# Patient Record
Sex: FEMALE | Race: WHITE | Employment: OTHER | URBAN - METROPOLITAN AREA
[De-identification: names, ages, dates, MRNs, and addresses within clinical notes are randomized per-mention and may not be internally consistent; named-entity substitution may affect disease eponyms.]

---

## 2018-03-14 ENCOUNTER — OFFICE VISIT (OUTPATIENT)
Dept: FAMILY MEDICINE CLINIC | Age: 53
End: 2018-03-14
Payer: COMMERCIAL

## 2018-03-14 VITALS
DIASTOLIC BLOOD PRESSURE: 70 MMHG | RESPIRATION RATE: 16 BRPM | HEIGHT: 67 IN | SYSTOLIC BLOOD PRESSURE: 118 MMHG | OXYGEN SATURATION: 99 % | WEIGHT: 148 LBS | HEART RATE: 88 BPM | BODY MASS INDEX: 23.23 KG/M2 | TEMPERATURE: 97.8 F

## 2018-03-14 DIAGNOSIS — Z00.00 WELLNESS EXAMINATION: Primary | ICD-10-CM

## 2018-03-14 PROCEDURE — 99396 PREV VISIT EST AGE 40-64: CPT | Performed by: FAMILY MEDICINE

## 2018-03-14 RX ORDER — ACETAMINOPHEN 160 MG
1 TABLET,DISINTEGRATING ORAL DAILY
COMMUNITY

## 2018-03-14 RX ORDER — MULTIVITAMIN WITH IRON
100 TABLET ORAL DAILY
COMMUNITY
End: 2020-04-22

## 2018-03-14 RX ORDER — INFLUENZA A VIRUS A/MICHIGAN/45/2015 X-275 (H1N1) ANTIGEN (FORMALDEHYDE INACTIVATED), INFLUENZA A VIRUS A/SINGAPORE/INFIMH-16-0019/2016 IVR-186 (H3N2) ANTIGEN (FORMALDEHYDE INACTIVATED), INFLUENZA B VIRUS B/PHUKET/3073/2013 ANTIGEN (FORMALDEHYDE INACTIVATED), AND INFLUENZA B VIRUS B/MARYLAND/15/2016 BX-69A ANTIGEN (FORMALDEHYDE INACTIVATED) 15; 15; 15; 15 UG/.5ML; UG/.5ML; UG/.5ML; UG/.5ML
INJECTION, SUSPENSION INTRAMUSCULAR
Refills: 0 | COMMUNITY
Start: 2018-01-19 | End: 2018-03-14 | Stop reason: CLARIF

## 2018-03-14 ASSESSMENT — PATIENT HEALTH QUESTIONNAIRE - PHQ9
SUM OF ALL RESPONSES TO PHQ QUESTIONS 1-9: 0
1. LITTLE INTEREST OR PLEASURE IN DOING THINGS: 0
2. FEELING DOWN, DEPRESSED OR HOPELESS: 0
SUM OF ALL RESPONSES TO PHQ9 QUESTIONS 1 & 2: 0

## 2018-03-14 NOTE — PROGRESS NOTES
3/14/2018    Chief Complaint   Patient presents with    Annual Exam          Subjective   patient is here for a yearly physical.  No complaints           1. Wellness examination    - Lipid Panel; Future  - Comprehensive Metabolic Panel; Future  - CBC Auto Differential; Future  - TSH without Reflex;  Future  - Vitamin D 25 Hydrox, D2 & D3; Future        Goals      Blood Pressure < 140/90      Exercise 3x per week (30 min per time)         Goals   Review Of Systems    General:  No weight change no malaise no fatigue no change in appetite no sleep disturbance no fever/chills no night sweats  Skin:                 no abnormal pigmentation, no rash, no scaling,no itching,no Masses,no  hair or nail changes  Eyes:               no blurring, no diplopia, no  eye pain no glaucoma no cataracts + glasses   ENT:                 no hearing loss,no  Tinnitus,no  Vertigo,no osebleed, no nasal congestion, no rhinorrhea,  no sore throat no jaw pain no hoarseness no bleeding  Gums   ___ dentures  Neck:                no node tenderness , not rigid, no masses   Respiratory:            no cough, no sputum,  No coughing blood, no pleuritic , no chest pain, no dyspnea,  no wheezing  Cardiovascular:         no angina,  No chest pain  No syncope, no pedal edema , no orthopnea, no PND, no palpitations, no claudication  Gastrointestinal  no nausea, no vomiting, no heartburn, no diarrhea, no constipation, no bloating,  no abdominal pain, no rectal pain, no bleeding no hemorrhoids, no hernia  Genitourinary:           no urinary urgency, no frequency, no dysuria, no nocturia, hesitancy, no  Incontinence, no bleeding, no stones  Musculoskeletal:         no arthritis, no  arthralgia, no myalgia,no  weakness,  no morning stiffness, no joint swelling  Neurologic:                no paralysis, no resis,no  paresthesia, no seizures,no  tremors,no headaches, no tumors , no stroke, no speech issues,  No incoordination, no head trauma, no memory dry. Not pale, not flushed , no lesions, No rash , no bruises  HEENT:   PERRLA, EOMI. Conjunctiva clear, no  Drainage, no jaundice, ext canals normal ,no cerumen impaction,TM's normal ,not injected ,no fluid . Normal nasal mucosa not boggy , not inflamed . Septum normal, turbinates not swollen, oral mucosa moist , lips normal, teeth normal, tongue normal, salivary glands normal, no sinus tenderness, tmj normal, throat no injected, no cobblestoning, no drainage , tonsils normal no exudates  Neck:    Supple,No JVD, No thyromegaly, No carotid bruit, no adenopathy  Cardiac:   PMI Normal,  RRR, No gallop , no  murmur. No S3, no S4, no abd aortic bruit, normal pulses, , no pedal edema  Lungs:   CTA, symmetrical,  No wheezes no rales, Normal percussion, no use of accessory muscles,   Abdomen:  Normal bowel sounds, abd soft, non-tender, no rebound no guarding,no hepatomegaly, no splenomegaly, no hernia, no ascites, normal palpation  Extremities:   No clubbing, no edema no cyanosis. Pedal pulses ok  M/S:  Back normal,no kyphosis, no cva tenderness,  Head and neck normal rom, shoulders normal strength , , hips normal , gait normal , no cane or walker , no motor deficits, no clubbing, normal nails  Neurological:   A & O x 3, Moves all extremities,  No gross neuro deficits ,normal DTR, normal memory, normal judgement/insight, normal affect    Physical exa       Assessment:    1. Wellness examination    - Lipid Panel; Future  - Comprehensive Metabolic Panel; Future  - CBC Auto Differential; Future  - TSH without Reflex; Future  - Vitamin D 25 Hydrox, D2 & D3; Future                      4. Reviewed labs    5. No Follow-up on file.            Angela Hayward M.D.    3/14/2018

## 2018-04-02 LAB
ALBUMIN SERPL-MCNC: NORMAL G/DL
ALP BLD-CCNC: NORMAL U/L
ALT SERPL-CCNC: NORMAL U/L
ANION GAP SERPL CALCULATED.3IONS-SCNC: NORMAL MMOL/L
AST SERPL-CCNC: NORMAL U/L
BILIRUB SERPL-MCNC: NORMAL MG/DL (ref 0.1–1.4)
BUN BLDV-MCNC: NORMAL MG/DL
CALCIUM SERPL-MCNC: NORMAL MG/DL
CHLORIDE BLD-SCNC: NORMAL MMOL/L
CHOLESTEROL, TOTAL: 214 MG/DL
CHOLESTEROL/HDL RATIO: 3.8
CO2: NORMAL MMOL/L
CREAT SERPL-MCNC: NORMAL MG/DL
GFR CALCULATED: NORMAL
GLUCOSE BLD-MCNC: 90 MG/DL
HDLC SERPL-MCNC: 57 MG/DL (ref 35–70)
LDL CHOLESTEROL CALCULATED: 133 MG/DL (ref 0–160)
POTASSIUM SERPL-SCNC: NORMAL MMOL/L
SODIUM BLD-SCNC: NORMAL MMOL/L
TOTAL PROTEIN: NORMAL
TRIGL SERPL-MCNC: 129 MG/DL
VLDLC SERPL CALC-MCNC: NORMAL MG/DL

## 2018-04-10 DIAGNOSIS — Z00.00 WELLNESS EXAMINATION: ICD-10-CM

## 2018-04-13 PROBLEM — Z00.00 WELLNESS EXAMINATION: Status: RESOLVED | Noted: 2018-03-14 | Resolved: 2018-04-13

## 2019-10-29 ENCOUNTER — OFFICE VISIT (OUTPATIENT)
Dept: FAMILY MEDICINE CLINIC | Age: 54
End: 2019-10-29
Payer: COMMERCIAL

## 2019-10-29 VITALS
BODY MASS INDEX: 23.86 KG/M2 | RESPIRATION RATE: 16 BRPM | WEIGHT: 152 LBS | OXYGEN SATURATION: 98 % | DIASTOLIC BLOOD PRESSURE: 62 MMHG | HEART RATE: 74 BPM | TEMPERATURE: 97.4 F | SYSTOLIC BLOOD PRESSURE: 102 MMHG | HEIGHT: 67 IN

## 2019-10-29 DIAGNOSIS — R42 DIZZINESS: Primary | ICD-10-CM

## 2019-10-29 DIAGNOSIS — E55.9 VITAMIN D DEFICIENCY: ICD-10-CM

## 2019-10-29 DIAGNOSIS — Z00.00 WELLNESS EXAMINATION: ICD-10-CM

## 2019-10-29 DIAGNOSIS — C44.320 SQUAMOUS CELL CARCINOMA OF FACE: ICD-10-CM

## 2019-10-29 DIAGNOSIS — Z53.1 REFUSAL OF BLOOD TRANSFUSIONS AS PATIENT IS JEHOVAH'S WITNESS: ICD-10-CM

## 2019-10-29 PROCEDURE — 99396 PREV VISIT EST AGE 40-64: CPT | Performed by: FAMILY MEDICINE

## 2019-10-29 PROCEDURE — 93000 ELECTROCARDIOGRAM COMPLETE: CPT | Performed by: FAMILY MEDICINE

## 2019-10-29 ASSESSMENT — ENCOUNTER SYMPTOMS
STRIDOR: 0
SHORTNESS OF BREATH: 0
GASTROINTESTINAL NEGATIVE: 1
COUGH: 0
CHEST TIGHTNESS: 0

## 2019-10-29 ASSESSMENT — PATIENT HEALTH QUESTIONNAIRE - PHQ9
2. FEELING DOWN, DEPRESSED OR HOPELESS: 0
SUM OF ALL RESPONSES TO PHQ9 QUESTIONS 1 & 2: 0
SUM OF ALL RESPONSES TO PHQ QUESTIONS 1-9: 0
SUM OF ALL RESPONSES TO PHQ QUESTIONS 1-9: 0
1. LITTLE INTEREST OR PLEASURE IN DOING THINGS: 0

## 2019-11-25 ENCOUNTER — TELEPHONE (OUTPATIENT)
Dept: FAMILY MEDICINE CLINIC | Age: 54
End: 2019-11-25

## 2019-11-25 LAB
ALBUMIN SERPL-MCNC: NORMAL G/DL
ALP BLD-CCNC: NORMAL U/L
ALT SERPL-CCNC: NORMAL U/L
ANION GAP SERPL CALCULATED.3IONS-SCNC: NORMAL MMOL/L
AST SERPL-CCNC: NORMAL U/L
BASOPHILS ABSOLUTE: NORMAL
BASOPHILS RELATIVE PERCENT: NORMAL
BILIRUB SERPL-MCNC: NORMAL MG/DL
BUN BLDV-MCNC: NORMAL MG/DL
CALCIUM SERPL-MCNC: NORMAL MG/DL
CHLORIDE BLD-SCNC: NORMAL MMOL/L
CO2: NORMAL
CREAT SERPL-MCNC: NORMAL MG/DL
EOSINOPHILS ABSOLUTE: NORMAL
EOSINOPHILS RELATIVE PERCENT: NORMAL
GFR CALCULATED: NORMAL
GLUCOSE BLD-MCNC: 72 MG/DL
HCT VFR BLD CALC: 42.5 % (ref 36–46)
HEMOGLOBIN: 14.1 G/DL (ref 12–16)
LYMPHOCYTES ABSOLUTE: NORMAL
LYMPHOCYTES RELATIVE PERCENT: NORMAL
MCH RBC QN AUTO: NORMAL PG
MCHC RBC AUTO-ENTMCNC: NORMAL G/DL
MCV RBC AUTO: NORMAL FL
MONOCYTES ABSOLUTE: NORMAL
MONOCYTES RELATIVE PERCENT: NORMAL
NEUTROPHILS ABSOLUTE: NORMAL
NEUTROPHILS RELATIVE PERCENT: NORMAL
PDW BLD-RTO: NORMAL %
PLATELET # BLD: NORMAL 10*3/UL
PMV BLD AUTO: NORMAL FL
POTASSIUM SERPL-SCNC: NORMAL MMOL/L
RBC # BLD: NORMAL 10*6/UL
SODIUM BLD-SCNC: NORMAL MMOL/L
TOTAL PROTEIN: NORMAL
TSH SERPL DL<=0.05 MIU/L-ACNC: 1.59 UIU/ML
VITAMIN D 25-HYDROXY: NORMAL
VITAMIN D2, 25 HYDROXY: NORMAL
VITAMIN D3,25 HYDROXY: NORMAL
WBC # BLD: NORMAL 10*3/UL

## 2019-12-02 LAB — MAMMOGRAPHY, EXTERNAL: NORMAL

## 2019-12-04 ENCOUNTER — TELEPHONE (OUTPATIENT)
Dept: FAMILY MEDICINE CLINIC | Age: 54
End: 2019-12-04

## 2019-12-13 DIAGNOSIS — R42 DIZZINESS: ICD-10-CM

## 2019-12-13 DIAGNOSIS — Z00.00 WELLNESS EXAMINATION: ICD-10-CM

## 2019-12-13 DIAGNOSIS — E55.9 VITAMIN D DEFICIENCY: ICD-10-CM

## 2019-12-13 LAB
CHOLESTEROL, TOTAL: 213 MG/DL
CHOLESTEROL/HDL RATIO: 3.3
HDLC SERPL-MCNC: 64 MG/DL (ref 35–70)
LDL CHOLESTEROL CALCULATED: 137 MG/DL (ref 0–160)
TRIGL SERPL-MCNC: 58 MG/DL
VLDLC SERPL CALC-MCNC: NORMAL MG/DL

## 2020-04-22 ENCOUNTER — HOSPITAL ENCOUNTER (OUTPATIENT)
Age: 55
Discharge: HOME | End: 2020-04-22
Payer: COMMERCIAL

## 2020-04-22 ENCOUNTER — OFFICE VISIT (OUTPATIENT)
Dept: PRIMARY CARE CLINIC | Age: 55
End: 2020-04-22
Payer: COMMERCIAL

## 2020-04-22 ENCOUNTER — TELEPHONE (OUTPATIENT)
Dept: FAMILY MEDICINE CLINIC | Age: 55
End: 2020-04-22

## 2020-04-22 VITALS
TEMPERATURE: 98.5 F | WEIGHT: 150 LBS | HEART RATE: 80 BPM | HEIGHT: 67 IN | OXYGEN SATURATION: 98 % | BODY MASS INDEX: 23.54 KG/M2 | SYSTOLIC BLOOD PRESSURE: 110 MMHG | DIASTOLIC BLOOD PRESSURE: 70 MMHG

## 2020-04-22 LAB
ALBUMIN SERPL-MCNC: 4.4 G/DL (ref 3.5–5.2)
ALP BLD-CCNC: 58 U/L (ref 35–104)
ALT SERPL-CCNC: 18 U/L (ref 0–32)
ANION GAP SERPL CALCULATED.3IONS-SCNC: 12 MMOL/L (ref 7–16)
AST SERPL-CCNC: 15 U/L (ref 0–31)
BASOPHILS ABSOLUTE: 0.04 E9/L (ref 0–0.2)
BASOPHILS RELATIVE PERCENT: 0.5 % (ref 0–2)
BILIRUB SERPL-MCNC: 0.3 MG/DL (ref 0–1.2)
BUN BLDV-MCNC: 17 MG/DL (ref 6–20)
CALCIUM SERPL-MCNC: 9.4 MG/DL (ref 8.6–10.2)
CHLORIDE BLD-SCNC: 107 MMOL/L (ref 98–107)
CO2: 25 MMOL/L (ref 22–29)
CREAT SERPL-MCNC: 0.7 MG/DL (ref 0.5–1)
EOSINOPHILS ABSOLUTE: 0.12 E9/L (ref 0.05–0.5)
EOSINOPHILS RELATIVE PERCENT: 1.6 % (ref 0–6)
GFR AFRICAN AMERICAN: >60
GFR NON-AFRICAN AMERICAN: >60 ML/MIN/1.73
GLUCOSE BLD-MCNC: 92 MG/DL (ref 74–99)
HCT VFR BLD CALC: 39.8 % (ref 34–48)
HEMOGLOBIN: 13.2 G/DL (ref 11.5–15.5)
IMMATURE GRANULOCYTES #: 0.01 E9/L
IMMATURE GRANULOCYTES %: 0.1 % (ref 0–5)
LYMPHOCYTES ABSOLUTE: 2.74 E9/L (ref 1.5–4)
LYMPHOCYTES RELATIVE PERCENT: 37.4 % (ref 20–42)
MCH RBC QN AUTO: 31.2 PG (ref 26–35)
MCHC RBC AUTO-ENTMCNC: 33.2 % (ref 32–34.5)
MCV RBC AUTO: 94.1 FL (ref 80–99.9)
MONOCYTES ABSOLUTE: 0.42 E9/L (ref 0.1–0.95)
MONOCYTES RELATIVE PERCENT: 5.7 % (ref 2–12)
NEUTROPHILS ABSOLUTE: 4 E9/L (ref 1.8–7.3)
NEUTROPHILS RELATIVE PERCENT: 54.7 % (ref 43–80)
PDW BLD-RTO: 12.7 FL (ref 11.5–15)
PLATELET # BLD: 221 E9/L (ref 130–450)
PMV BLD AUTO: 10.9 FL (ref 7–12)
POTASSIUM SERPL-SCNC: 3.4 MMOL/L (ref 3.5–5)
RBC # BLD: 4.23 E12/L (ref 3.5–5.5)
SODIUM BLD-SCNC: 144 MMOL/L (ref 132–146)
TOTAL PROTEIN: 7 G/DL (ref 6.4–8.3)
WBC # BLD: 7.3 E9/L (ref 4.5–11.5)

## 2020-04-22 PROCEDURE — 85025 COMPLETE CBC W/AUTO DIFF WBC: CPT

## 2020-04-22 PROCEDURE — 87329 GIARDIA AG IA: CPT

## 2020-04-22 PROCEDURE — 80053 COMPREHEN METABOLIC PANEL: CPT

## 2020-04-22 PROCEDURE — 87328 CRYPTOSPORIDIUM AG IA: CPT

## 2020-04-22 PROCEDURE — 87045 FECES CULTURE AEROBIC BACT: CPT

## 2020-04-22 PROCEDURE — 36415 COLL VENOUS BLD VENIPUNCTURE: CPT

## 2020-04-22 PROCEDURE — 99213 OFFICE O/P EST LOW 20 MIN: CPT | Performed by: FAMILY MEDICINE

## 2020-04-22 RX ORDER — MELATONIN 5 MG
1 TABLET,CHEWABLE ORAL NIGHTLY PRN
COMMUNITY
End: 2021-05-26

## 2020-04-22 NOTE — TELEPHONE ENCOUNTER
Cosme Hernández says she has had diarrhea for about 1.5 weeks. Some nausea, low fever, 99.3. Never has bowel problems. Suggested she go to Flu clinic because some of symptoms of covid.

## 2020-04-22 NOTE — PATIENT INSTRUCTIONS
Patient Education        Diarrhea: Care Instructions  Your Care Instructions    Diarrhea is loose, watery stools (bowel movements). The exact cause is often hard to find. Sometimes diarrhea is your body's way of getting rid of what caused an upset stomach. Viruses, food poisoning, and many medicines can cause diarrhea. Some people get diarrhea in response to emotional stress, anxiety, or certain foods. Almost everyone has diarrhea now and then. It usually isn't serious, and your stools will return to normal soon. The important thing to do is replace the fluids you have lost, so you can prevent dehydration. The doctor has checked you carefully, but problems can develop later. If you notice any problems or new symptoms, get medical treatment right away. Follow-up care is a key part of your treatment and safety. Be sure to make and go to all appointments, and call your doctor if you are having problems. It's also a good idea to know your test results and keep a list of the medicines you take. How can you care for yourself at home? · Watch for signs of dehydration, which means your body has lost too much water. Dehydration is a serious condition and should be treated right away. Signs of dehydration are:  ? Increasing thirst and dry eyes and mouth. ? Feeling faint or lightheaded. ? A smaller amount of urine than normal.  · To prevent dehydration, drink plenty of fluids. Choose water and other caffeine-free clear liquids until you feel better. If you have kidney, heart, or liver disease and have to limit fluids, talk with your doctor before you increase the amount of fluids you drink. · Begin eating small amounts of mild foods the next day, if you feel like it. ? Try yogurt that has live cultures of Lactobacillus. (Check the label.)  ? Avoid spicy foods, fruits, alcohol, and caffeine until 48 hours after all symptoms are gone. ? Avoid chewing gum that contains sorbitol. ?  Avoid dairy products (except for

## 2020-04-22 NOTE — PROGRESS NOTES
[facility]  1965    Chief Complaint   Patient presents with    Diarrhea    Fatigue       Respiratory Symptoms:  Patient complains of Diarrhea  And Fatigue  1 week(s) history of occational headachec and nausea without vomiting. Symptoms have been worsening with time. She denies any other symptoms . She states that she has had diarrhea for about 9 days. She states her temp has been around 99.3 F yesterday. She has had nausea. She states that diarrhea is worse in the morning and the evening about 9 times a day. She states that she has had worse looking diarrhea. Diarrhea is watery in nature. She states that there were a few times that the stool improved to normal and then the diarrhea started again. She denies any recent antibiotic usage. She denies any vomiting. She denies blood in her stool or dark and tarry stools. She states that she feels bloated. Denies abdominal pain, cough, shortness of breath, nasal congestion, runny nose, sore throat. [place]. She has started to use the well water in her coffee and tea which is not something she usually does. Relevant PMH: No pertinent PMH. Smoking history:  She  reports that she has never smoked. She has never used smokeless tobacco.     She has had no known ill contacts. Treatment to date: NSAID. Travel screen completed:  Yes    BPA for COVID triggered: No      Vitals:    04/22/20 1228   BP: 110/70   Pulse: 80   Temp: 98.5 °F (36.9 °C)   TempSrc: Oral   SpO2: 98%   Weight: 150 lb (68 kg)   Height: 5' 7\" (1.702 m)      Physical Exam  Vitals signs and nursing note reviewed. Constitutional:       Appearance: She is well-developed. HENT:      Head: Normocephalic and atraumatic.       Right Ear: External ear normal.      Left Ear: External ear normal.      Nose: Nose normal.   Eyes:      Conjunctiva/sclera: Conjunctivae normal.      Pupils: Pupils are equal, round, and reactive to

## 2020-04-23 LAB
CRYPTOSPORIDIUM ANTIGEN STOOL: NORMAL
GIARDIA ANTIGEN STOOL: NORMAL

## 2020-04-24 LAB — CULTURE, STOOL: NORMAL

## 2020-04-29 ENCOUNTER — TELEPHONE (OUTPATIENT)
Dept: PRIMARY CARE CLINIC | Age: 55
End: 2020-04-29

## 2020-10-30 ENCOUNTER — TELEPHONE (OUTPATIENT)
Dept: ADMINISTRATIVE | Age: 55
End: 2020-10-30

## 2020-11-04 ENCOUNTER — NURSE ONLY (OUTPATIENT)
Dept: FAMILY MEDICINE CLINIC | Age: 55
End: 2020-11-04
Payer: COMMERCIAL

## 2020-11-04 PROCEDURE — 90471 IMMUNIZATION ADMIN: CPT | Performed by: FAMILY MEDICINE

## 2020-11-04 PROCEDURE — 90686 IIV4 VACC NO PRSV 0.5 ML IM: CPT | Performed by: FAMILY MEDICINE

## 2021-05-26 ENCOUNTER — OFFICE VISIT (OUTPATIENT)
Dept: FAMILY MEDICINE CLINIC | Age: 56
End: 2021-05-26
Payer: COMMERCIAL

## 2021-05-26 VITALS
DIASTOLIC BLOOD PRESSURE: 70 MMHG | BODY MASS INDEX: 24.58 KG/M2 | WEIGHT: 156.6 LBS | HEIGHT: 67 IN | SYSTOLIC BLOOD PRESSURE: 110 MMHG | OXYGEN SATURATION: 98 % | TEMPERATURE: 97.2 F | HEART RATE: 69 BPM | RESPIRATION RATE: 16 BRPM

## 2021-05-26 DIAGNOSIS — K40.20 NON-RECURRENT BILATERAL INGUINAL HERNIA WITHOUT OBSTRUCTION OR GANGRENE: ICD-10-CM

## 2021-05-26 DIAGNOSIS — Z00.00 WELLNESS EXAMINATION: ICD-10-CM

## 2021-05-26 DIAGNOSIS — E55.9 VITAMIN D DEFICIENCY: Primary | ICD-10-CM

## 2021-05-26 PROCEDURE — 99396 PREV VISIT EST AGE 40-64: CPT | Performed by: FAMILY MEDICINE

## 2021-05-26 SDOH — ECONOMIC STABILITY: FOOD INSECURITY: WITHIN THE PAST 12 MONTHS, YOU WORRIED THAT YOUR FOOD WOULD RUN OUT BEFORE YOU GOT MONEY TO BUY MORE.: NEVER TRUE

## 2021-05-26 SDOH — ECONOMIC STABILITY: FOOD INSECURITY: WITHIN THE PAST 12 MONTHS, THE FOOD YOU BOUGHT JUST DIDN'T LAST AND YOU DIDN'T HAVE MONEY TO GET MORE.: NEVER TRUE

## 2021-05-26 ASSESSMENT — PATIENT HEALTH QUESTIONNAIRE - PHQ9
SUM OF ALL RESPONSES TO PHQ QUESTIONS 1-9: 0
2. FEELING DOWN, DEPRESSED OR HOPELESS: 0
1. LITTLE INTEREST OR PLEASURE IN DOING THINGS: 0
SUM OF ALL RESPONSES TO PHQ QUESTIONS 1-9: 0
SUM OF ALL RESPONSES TO PHQ QUESTIONS 1-9: 0
SUM OF ALL RESPONSES TO PHQ9 QUESTIONS 1 & 2: 0

## 2021-05-26 ASSESSMENT — SOCIAL DETERMINANTS OF HEALTH (SDOH): HOW HARD IS IT FOR YOU TO PAY FOR THE VERY BASICS LIKE FOOD, HOUSING, MEDICAL CARE, AND HEATING?: NOT HARD AT ALL

## 2021-05-26 NOTE — PROGRESS NOTES
2021        Cassandra Michaud (: 1965 IS A 60 y.o. female ,Established patient, here for eval of Annual Exam          Current Outpatient Medications   Medication Sig Dispense Refill    Cholecalciferol (VITAMIN D3) 2000 units CAPS Take 1 capsule by mouth daily  (Patient not taking: Reported on 2021)       No current facility-administered medications for this visit.       mediterrean diet  Exercise wise house cleaing  Garden and hoarses    No covid  Vaccine     Sleep issue is bad   Melatonin is working but gives diarrhea      abd pain in the past around inguinal area. Knows she has to do something with this.  [facility] elect[facility] to see Dr Diego Valentino at [facility] and referral will be made when confirms with a phone call back ot us    ASSESSMENT / PLAN      1. Non-recurrent bilateral inguinal hernia without obstruction or gangrene  Surgical referral    2. Vitamin D deficiency  Last vit d level 25  Taking 1000 iu daily  recheck  - Vitamin D 25 Hydroxy; Future    3. Wellness examination    - CBC Auto Differential; Future  - Comprehensive Metabolic Panel; Future  - Lipid Panel; Future  - Vitamin D 25 Hydroxy; Future              SUBJECTIVE    Review of Systems   Constitutional: Negative for activity change, appetite change and fever. Respiratory: Negative. Cardiovascular: Negative. Gastrointestinal: Positive for abdominal pain. Hx hernia bilat     Genitourinary: Negative. Neurological: Negative. OBJECTIVE    Wt Readings from Last 3 Encounters:   21 156 lb 9.6 oz (71 kg)   20 150 lb (68 kg)   10/29/19 152 lb (68.9 kg)       Vitals:    21 0938   BP: 110/70   Pulse: 69   Resp: 16   Temp: 97.2 °F (36.2 °C)   SpO2: 98%       Physical Exam  Constitutional:       Appearance: Normal appearance. Cardiovascular:      Rate and Rhythm: Normal rate and regular rhythm. Heart sounds: Normal heart sounds. Pulmonary:      Breath sounds: Normal breath sounds.    Abdominal:

## 2021-07-28 LAB — MAMMOGRAPHY, EXTERNAL: NORMAL

## 2021-07-29 DIAGNOSIS — E55.9 VITAMIN D DEFICIENCY: ICD-10-CM

## 2021-07-29 DIAGNOSIS — Z00.00 WELLNESS EXAMINATION: ICD-10-CM

## 2021-07-29 LAB
ALBUMIN SERPL-MCNC: NORMAL G/DL
ALP BLD-CCNC: NORMAL U/L
ALT SERPL-CCNC: 19 U/L
ANION GAP SERPL CALCULATED.3IONS-SCNC: NORMAL MMOL/L
AST SERPL-CCNC: 19 U/L
BASOPHILS ABSOLUTE: NORMAL
BASOPHILS RELATIVE PERCENT: NORMAL
BILIRUB SERPL-MCNC: NORMAL MG/DL
BUN BLDV-MCNC: 15 MG/DL
CALCIUM SERPL-MCNC: NORMAL MG/DL
CHLORIDE BLD-SCNC: NORMAL MMOL/L
CHOLESTEROL, TOTAL: 213 MG/DL
CHOLESTEROL/HDL RATIO: NORMAL
CO2: NORMAL
CREAT SERPL-MCNC: 0.66 MG/DL
EOSINOPHILS ABSOLUTE: NORMAL
EOSINOPHILS RELATIVE PERCENT: NORMAL
GFR CALCULATED: NORMAL
GLUCOSE BLD-MCNC: 70 MG/DL
HCT VFR BLD CALC: 41.1 % (ref 36–46)
HDLC SERPL-MCNC: 60 MG/DL (ref 35–70)
HEMOGLOBIN: 13.9 G/DL (ref 12–16)
LDL CHOLESTEROL CALCULATED: 127 MG/DL (ref 0–160)
LYMPHOCYTES ABSOLUTE: NORMAL
LYMPHOCYTES RELATIVE PERCENT: NORMAL
MCH RBC QN AUTO: NORMAL PG
MCHC RBC AUTO-ENTMCNC: NORMAL G/DL
MCV RBC AUTO: NORMAL FL
MONOCYTES ABSOLUTE: NORMAL
MONOCYTES RELATIVE PERCENT: NORMAL
NEUTROPHILS ABSOLUTE: NORMAL
NEUTROPHILS RELATIVE PERCENT: NORMAL
NONHDLC SERPL-MCNC: NORMAL MG/DL
PLATELET # BLD: NORMAL 10*3/UL
PMV BLD AUTO: NORMAL FL
POTASSIUM SERPL-SCNC: NORMAL MMOL/L
RBC # BLD: NORMAL 10*6/UL
SODIUM BLD-SCNC: NORMAL MMOL/L
TOTAL PROTEIN: NORMAL
TRIGL SERPL-MCNC: 129 MG/DL
VITAMIN D 25-HYDROXY: 38.8
VITAMIN D2, 25 HYDROXY: NORMAL
VITAMIN D3,25 HYDROXY: NORMAL
VLDLC SERPL CALC-MCNC: NORMAL MG/DL
WBC # BLD: NORMAL 10*3/UL

## 2021-08-11 ENCOUNTER — TELEPHONE (OUTPATIENT)
Dept: FAMILY MEDICINE CLINIC | Age: 56
End: 2021-08-11

## 2021-08-11 DIAGNOSIS — G47.00 INSOMNIA, UNSPECIFIED TYPE: Primary | ICD-10-CM

## 2021-08-11 NOTE — TELEPHONE ENCOUNTER
----- Message from Juani Soto MA sent at 8/11/2021  1:27 PM EDT -----  Subject: Referral Request    QUESTIONS   Reason for referral request? Referral to [address] [place], [place] [address]mar [facility] Sleep lab. saw   Dr Margarita Fletcher and he recommended sleep study. Has the physician seen you for this condition before? Yes  Select a date? 2021-05-26  Select the Provider the patient wants to be referred to, if known (PCP or   Specialist)? Outside Physician - [address] [place] [address]   Preferred Specialist (if applicable)? Do you already have an appointment scheduled? No  Additional Information for Provider?   ---------------------------------------------------------------------------  --------------  CALL BACK INFO  What is the best way for the office to contact you? OK to leave message on   voicemail  Preferred Call Back Phone Number?  [phone]

## 2021-08-12 ENCOUNTER — TELEPHONE (OUTPATIENT)
Dept: FAMILY MEDICINE CLINIC | Age: 56
End: 2021-08-12

## 2021-08-12 DIAGNOSIS — K40.20 NON-RECURRENT BILATERAL INGUINAL HERNIA WITHOUT OBSTRUCTION OR GANGRENE: Primary | ICD-10-CM

## 2021-08-12 NOTE — TELEPHONE ENCOUNTER
----- Message from [facility] sent at 8/12/2021  3:30 PM EDT -----  Subject: Referral Request    QUESTIONS   Reason for referral request? Hernia Repair   Has the physician seen you for this condition before? Yes  Select a date? 2021-05-26  Select the Provider the patient wants to be referred to, if known (PCP or   Specialist)? Outside Physician - Nataliya Segovia   Preferred Specialist (if applicable)? Do you already have an appointment scheduled? No  Additional Information for Provider? Fax number? [phone]  ---------------------------------------------------------------------------  --------------  Ketan [place] INFO  What is the best way for the office to contact you? OK to leave message on   voicemail  Preferred Call Back Phone Number?  [phone]

## 2021-08-18 ASSESSMENT — ENCOUNTER SYMPTOMS
RESPIRATORY NEGATIVE: 1
ABDOMINAL PAIN: 1

## 2021-09-13 ENCOUNTER — TELEPHONE (OUTPATIENT)
Dept: SLEEP MEDICINE | Age: 56
End: 2021-09-13

## 2021-09-15 ENCOUNTER — OFFICE VISIT (OUTPATIENT)
Dept: SLEEP MEDICINE | Age: 56
End: 2021-09-15
Payer: COMMERCIAL

## 2021-09-15 VITALS
BODY MASS INDEX: 24.58 KG/M2 | DIASTOLIC BLOOD PRESSURE: 70 MMHG | WEIGHT: 156.6 LBS | HEIGHT: 67 IN | OXYGEN SATURATION: 98 % | HEART RATE: 76 BPM | SYSTOLIC BLOOD PRESSURE: 134 MMHG | RESPIRATION RATE: 16 BRPM

## 2021-09-15 DIAGNOSIS — G47.33 OBSTRUCTIVE SLEEP APNEA: Primary | ICD-10-CM

## 2021-09-15 DIAGNOSIS — F51.04 PSYCHOPHYSIOLOGICAL INSOMNIA: ICD-10-CM

## 2021-09-15 PROCEDURE — G8427 DOCREV CUR MEDS BY ELIG CLIN: HCPCS | Performed by: NURSE PRACTITIONER

## 2021-09-15 PROCEDURE — G8420 CALC BMI NORM PARAMETERS: HCPCS | Performed by: NURSE PRACTITIONER

## 2021-09-15 PROCEDURE — 99244 OFF/OP CNSLTJ NEW/EST MOD 40: CPT | Performed by: NURSE PRACTITIONER

## 2021-09-15 ASSESSMENT — SLEEP AND FATIGUE QUESTIONNAIRES
HOW LIKELY ARE YOU TO NOD OFF OR FALL ASLEEP IN A CAR, WHILE STOPPED FOR A FEW MINUTES IN TRAFFIC: 3
HOW LIKELY ARE YOU TO NOD OFF OR FALL ASLEEP WHILE SITTING AND TALKING TO SOMEONE: 0
HOW LIKELY ARE YOU TO NOD OFF OR FALL ASLEEP WHILE SITTING AND READING: 3
HOW LIKELY ARE YOU TO NOD OFF OR FALL ASLEEP WHILE SITTING QUIETLY AFTER LUNCH WITHOUT ALCOHOL: 3
HOW LIKELY ARE YOU TO NOD OFF OR FALL ASLEEP WHEN YOU ARE A PASSENGER IN A CAR FOR AN HOUR WITHOUT A BREAK: 3
HOW LIKELY ARE YOU TO NOD OFF OR FALL ASLEEP WHILE WATCHING TV: 3
HOW LIKELY ARE YOU TO NOD OFF OR FALL ASLEEP WHILE SITTING INACTIVE IN A PUBLIC PLACE: 0
ESS TOTAL SCORE: 18
HOW LIKELY ARE YOU TO NOD OFF OR FALL ASLEEP WHILE LYING DOWN TO REST IN THE AFTERNOON WHEN CIRCUMSTANCES PERMIT: 3

## 2021-09-15 NOTE — PATIENT INSTRUCTIONS
It was a pleasure seeing you today Kathy Hernandes! Summary of Today's Visit           -Please do not drive when you are sleepy   -Please sign a request of records consent form, so that we may receive all of your previous sleep study reports and clinical notes, if they were not available today. - Please schedule a 2 month follow up today, to go over results     COVID-19  Due to the COVID-19 Pandemic a COVID test will be required at this point. The test should be preformed exactly 7 days prior to your sleep study. There is the risk for COVID-19 in all outpatient centers. Please review the CDC website for risk in your area and to review all current precautions. If you would like to switch to a home sleep study, please call our office at the number below. COVID-19 Testing Sites - Please note, these sites will not test anyone without a physician order. (As of October 2020)  [address] [place] [address]. Hours of Operation - Monday - Friday 6:00am - 2:30pm.   [address] Hours of Operation - Monday - Friday 6:00am - 2:30pm.    [address] [facility] [address]. Hours of Operation - Monday - Friday 6:00am - 2:30pm.        It is always advised that you check with your insurance company to determine how your study will be covered. For general questions or scheduling issues, call our nurse        Sandy Rich [phone] option 2  f- [phone]           The general categories for the treatment of Sleep Apnea include:     1. Supportive Care  -Elevate the head of the bed   -Avoid sleeping on your back      2. Weight loss  -Start a healthy weight loss program     3. Oral Appliance \"Mouth Piece\"  (Mandibular Advancement Device)     4. Positive pressure therapy with a machine and a mask (CPAP or BiPAP)     5. Different kinds of surgeries, including nasal surgery, surgery of the throat/windpipe, and nerve stimulation therapy (INSPIRE). Helpful Web Sites: For patients with ALL SLEEP DISORDERS: American Academy of Sleep Medicine http://sleepeducation. org; or I Do Now I Don't RestaurKyield: https://sleepfoundation. org  For patients with MASON: American Sleep Apnea Association: http://santiago.org/. org  For patients with RLS: RLS Foundation: aDx.brijesh  For patients with INSOMNIA: https://www.so.org/. org/articles/sleep/insomnia-causes-and-cures. htm  For patients with DEPRESSION: Ezoic.com.ee. com/depression         Sleep Medicine Terms     CPAP - Continuous Positive Airway Pressure - 1 set pressure (i.e. 7 cwp)  APAP - Automatic Positive Airway Pressure - PAP device determines in real time what pressure will work best confined to certain settings. (I.e. 4-15 cwp)  BiPAP - Bilevel Positive Airway Pressure - Higher pressure on taking a breath and lower pressure upon breathing out (i.e. 10/6 cwp)  CWP - Centimeters of water pressure   [facility] [place] [address] [place] [address] who sends you your CPAP/APAP/BiPAP and supplies. PSG - Polysomnogram - Overnight Sleep study  Titration Study - Overnight sleep study with the PAP device tired at different settings  Split Night study - PSG and titration study          Sleep Studies: About This Test  What is it? Sleep studies are tests that watch what happens to your body during sleep. These studies usually are done in a sleep lab. Sleep labs are often located in hospitals. Sleep studies you do at home can be done with portable equipment. But they may not give the same results as a sleep lab. Why is this test done? Sleep studies are done for people who say that sleep isn't restful or that they are tired all day. These studies can help find sleep problems, such as:  · Sleep apnea.  This means that an adult regularly stops breathing during sleep for 10 day.  · After your sleep problem has been identified, you may need a second study if your doctor orders treatment such as CPAP. Follow-up care is a key part of your treatment and safety. Be sure to make and go to all appointments, and call your doctor if you are having problems. It's also a good idea to keep a list of the medicines you take. Ask your doctor when you can expect to have your test results. Where can you learn more? Go to https://[employer].Gevo. org and sign in to your [employer] account. Enter O696 in the Copybar box to learn more about \"Sleep Studies: About This Test.\"     If you do not have an account, please click on the \"Sign Up Now\" link. Current as of: February 24, 2020               Content Version: 12.5  © 2006-2020 Healthwise, Incorporated. Care instructions adapted under license by [facility] ([facility]). If you have questions about a medical condition or this instruction, always ask your healthcare professional. [place] [address] any warranty or liability for your use of this information. Cognitive Behavioral Therapy for Insomnia Resources    Book: Say Phil to Insomnia by Dr. Steve Lassiter ($12 on Timescape)    [facility] To Sleep Module ($40): https://www.Mapluck.2CRisk/. com/Pages/Anali.htm        Biotene Gel  -Please try Biotene gel for dry mouth  -Make sure to get the Gel (lasts 2-3 hours as opposed to the liquid (1-2 minutes)  -It is available over the counter  -Use it as needed for dry mouth
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
## 2021-09-24 ENCOUNTER — TELEPHONE (OUTPATIENT)
Dept: SLEEP CENTER | Age: 56
End: 2021-09-24

## 2021-11-10 ENCOUNTER — TELEPHONE (OUTPATIENT)
Dept: SLEEP CENTER | Age: 56
End: 2021-11-10

## 2021-11-11 ENCOUNTER — HOSPITAL ENCOUNTER (OUTPATIENT)
Dept: SLEEP CENTER | Age: 56
Discharge: HOME | End: 2021-11-11
Payer: COMMERCIAL

## 2021-11-11 DIAGNOSIS — G47.33 OBSTRUCTIVE SLEEP APNEA: ICD-10-CM

## 2021-11-11 PROCEDURE — 95810 POLYSOM 6/> YRS 4/> PARAM: CPT

## 2021-11-16 ENCOUNTER — TELEPHONE (OUTPATIENT)
Dept: FAMILY MEDICINE CLINIC | Age: 56
End: 2021-11-16

## 2021-11-16 DIAGNOSIS — Z12.83 SCREENING EXAM FOR SKIN CANCER: Primary | ICD-10-CM

## 2021-11-16 NOTE — TELEPHONE ENCOUNTER
----- Message from DocSpera sent at 11/16/2021 10:57 AM EST -----  Subject: Referral Request    QUESTIONS   Reason for referral request? Dermatologist for annual skin check   Has the physician seen you for this condition before? No   Preferred Specialist (if applicable)? Do you already have an appointment scheduled? Yes  Select Scheduled Date? 2021-11-22  Select Scheduled Physician? Outside Physician - Meche Nam  Additional Information for Provider? [facility] number is [phone].  ---------------------------------------------------------------------------  --------------  CALL BACK INFO  What is the best way for the office to contact you? OK to leave message on   voicemail  Preferred Call Back Phone Number?  [phone]

## 2021-11-22 ENCOUNTER — TELEPHONE (OUTPATIENT)
Dept: FAMILY MEDICINE CLINIC | Age: 56
End: 2021-11-22

## 2021-11-22 NOTE — TELEPHONE ENCOUNTER
Spoke to pt and called the Dermatologist office. Faxed referral over. They stated she would not need an Auth. For a consult visit.

## 2021-11-22 NOTE — TELEPHONE ENCOUNTER
----- Message from Kwaab sent at 11/22/2021  9:54 AM EST -----  Subject: Message to Provider    QUESTIONS  Information for Provider? Patient has a dermatology appointment scheduled   for 11/22 around 2pm and the insurance ,[employer], states   they have not received any referrals. The patient does has a referral   listed in epic. Insurance company wants to make sure office has the   correct fax number to verify the referral and have it resent today asap   before scheduled appointment. Fax number for aLkia Price is [phone].   ---------------------------------------------------------------------------  --------------  Lainey VEGA  What is the best way for the office to contact you? OK to leave message on   voicemail  Preferred Call Back Phone Number? [phone]  ---------------------------------------------------------------------------  --------------  SCRIPT ANSWERS  Relationship to Patient?  Self

## 2021-11-22 NOTE — PROGRESS NOTES
[address] [place]                               SLEEP STUDY REPORT    PATIENT NAME: Liya Alfaro                  :        1965  MED REC NO:   [id number]                            ROOM:  ACCOUNT NO:   [de-identified]                           ADMIT DATE: 2021  PROVIDER:     Daniel Brandt MD    DATE OF STUDY:  2021    FULL NIGHT POLYSOMNOGRAM REPORT    LOCATION:  [address] [place] [address]. REFERRING PROVIDER:  CHARITO Smith    AGE: 60 yrs      SEX: Female          HEIGHT: 5 ft 7 in         WEIGHT: 152 lbs          BMI: 23.8 kg/m2    NECK CIRCUMFERENCE: 15 in    Symptoms: Excessive daytime sleepiness, snoring, napping, drowsy driving, difficulty falling/staying asleep. The Princeton Sleepiness Scale was 20 out of 24 (scores above or equal to 10 are suggestive of hypersomnolence). Indication: Suspected obstructive sleep apnea. Medical History:   Weight gain, claustrophobia, hyperlipidemia, hernia. Medications: Vitamin D3.    DESCRIPTION: This full night polysomnogram consisted of EEG, EOG, EMG and 2-lead ECG monitoring. Oronasal airflow (nasal pressure transducer and thermistor), chest and abdominal efforts by respiratory inductance plethysmography or polyvinylidene fluoride (PVDF) sensor, and pulse oximetry were monitored as well. Hypopneas were scored as at least a 30% reduction in amplitude of the semi-quantitative flow signal, associated with a 4% or greater oxygen desaturation. Respiratory effort related arousals (RERAs) were scored as at least a 30% reduction in amplitude of the semi-quantitative flow signal, associated with an EEG microarousal.     FINDINGS:  SLEEP CONTINUITY AND SLEEP ARCHITECTURE:  Lights were turned off at 9:51 PM and lights were turned on at 4:20 AM. Total recording time was 389 minutes and total sleep time was 206 minutes.  The overall sleep efficiency was severely reduced at 53%. Sleep onset latency was increased at 47 minutes and REM latency was normal 113 minutes. There were 13 awakenings during this study. The duration of wakefulness after sleep onset was 136 minutes. The amount of N1 sleep was 3% of total sleep time, or 7 minutes. The amount of N2 sleep was 50% of total sleep time, or 103 minutes. The amount of REM sleep was 13.5% of total sleep time, or 28 minutes. Slow wave sleep was 33.5% of total sleep time, or 69 minutes. The microarousal index was increased to 20; arousals were mostly related to respiratory events. RESPIRATORY MONITORING:  This study documented 20 obstructive apneas, 0 central apneas, 0 mixed apneas, 21 hypopneas, and 30 respiratory effort related arousals (RERAs) during the total recorded sleep time. The overall apnea/hypopnea index (AHI) was 12. The overall respiratory disturbance index (RDI includes RERAs) was 20.7. The non-REM RDI was  16 and the REM RDI was 50. The patient slept in the supine position for 40% of the total sleep time, and the supine RDI was 49. The patient slept in the left and right lateral positions for the remainder of the study, with respective RDI's of 4 and 0. Of note, all REM sleep was in the supine position. The average oxyhemoglobin saturation was 95% while awake, 93% during non-REM sleep and 93% during REM sleep. The overall 4% oxygen desaturation index during sleep was 11.4. The lowest oxygen saturation during sleep was 78%. Oxygen saturations were less than or equal to 88% for 2 minutes or ~1% of the total sleep time. Light snoring was noted. EEG:  No abnormal epileptiform activity was observed. ECG: The electrocardiogram documented normal sinus rhythm. The average heart rate during sleep was 63 beats per minute. PERIODIC LIMB MOVEMENTS: No periodic limb movements were noted. EMG/VIDEO MONITORING: Loss of REM atonia, bruxism, and parasomnias were not observed.     IMPRESSION: 1.  This study is consistent with mild to moderate obstructive sleep apnea. Sleep disordered breathing occurs predominantly in supine and REM sleep. Of note, nonsupine REM sleep was not observed in the study (so cannot be ascertained if this would lead to sleep disordered breathing). 2.  A split night study was not performed due to the mild to moderate nature of this patient's sleep disordered breathing, and overall poor sleep efficiency. RECOMMENDATIONS:    1. Treatment for mild obstructive sleep apnea should be based on the patient's symptoms and comorbid conditions. As discussed during her previous evaluation in the [address] [place] [address], the patient may be a good candidate for auto CPAP therapy. Positional therapy may also be a reasonable option (although it cannot be guaranteed that events in nonsupine REM sleep will be adequately managed). If the patient opts for CPAP therapy, an auto CPAP device will be ordered at the settings listed below. 2.  Per insurance requirements, the patient will be seen in clinical follow up within 3 months of receiving auto-CPAP therapy in order to document adherence to therapy, assess efficacy of the prescribed settings, and evaluate resolution of sleep-related complaints. 3.  The patient should be strongly counseled against driving while drowsy. 4.  The patient should be aware that weight gain and aging may increase the severity of obstructive sleep apnea. EQUIPMENT ORDERING INFORMATION:  DEVICE:  Auto CPAP with heated humidification and a remote modem. SETTINGS:  5 to 15 cm of water with EPR of 3. MASK TYPE:  Full-face mask, or alternative as chosen by patient. MASK SIZE:  To be fit by DME. SUPPLEMENTAL OXYGEN:  None.         Taqueria Becerril MD    D: 11/21/2021 15:05:47       T: 11/21/2021 17:24:44     [email or web address]  Job#: [id number]     Doc#: [id number]

## 2021-11-29 DIAGNOSIS — G47.33 OBSTRUCTIVE SLEEP APNEA: Primary | ICD-10-CM

## 2021-11-29 PROCEDURE — 95810 POLYSOM 6/> YRS 4/> PARAM: CPT | Performed by: INTERNAL MEDICINE

## 2021-11-29 NOTE — Clinical Note
Zackery WEBBER and Madeline,  Can you please call Ms. Lombardi with her results? If she is open to auto CPAP, I do think it will help her, likely more so than positional therapy alone. Her appointment with Mikayla Song may need pushed out, although she can keep it if she wishes to discuss her treatment and then make a separate compliance visit as well.   Thanks, Yang Arredondo

## 2021-11-29 NOTE — PROGRESS NOTES
Patient's PSG interpreted, consistent with mild-moderate MASON. Sleep disordered breathing occurred predominantly in supine and REM sleep. Patient will be notified of results. If the patient is amenable, as based upon her previous evaluation in the [address] [place] [address], she would be a good candidate for auto CPAP therapy. This will be ordered at the settings of 5-15 cmH2O. Alternatively, she may opt to trial positional therapy (i.e., avoiding supine sleep), but it cannot be guaranteed that events in nonsupine REM will be adequately managed (as this sleep was not observed on her sleep study). If she opts for CPAP, order will be sent to preferred DME. She will be reminded of insurance requirements for compliance and 30-day window to exchange mask interface. She will follow up in clinic within 3 months with CHARITO Bower.     Cassie Frazier MD

## 2021-12-06 ENCOUNTER — TELEPHONE (OUTPATIENT)
Dept: SLEEP MEDICINE | Age: 56
End: 2021-12-06

## 2021-12-06 NOTE — TELEPHONE ENCOUNTER
Spoke with pt re sleep study results (mild-moderate MASON). Let her know that Dr does recommend auto CPAP therapy , however pt can try sleeping on her side. Dr is unsure if this will help as she did not sleep on her side during her SS. Pt did decide to go with auto CPAP therapy.   She would like to use [employer] for her DME co.  Orders and all necessary info will be sent to [facility]

## 2021-12-07 NOTE — TELEPHONE ENCOUNTER
Called pt nd let her know that [facility] does not accept her insurance and All info and orders were sent to [facility].   Pt understood

## 2022-02-07 ENCOUNTER — OFFICE VISIT (OUTPATIENT)
Dept: SLEEP MEDICINE | Age: 57
End: 2022-02-07
Payer: COMMERCIAL

## 2022-02-07 VITALS
BODY MASS INDEX: 24.92 KG/M2 | OXYGEN SATURATION: 98 % | RESPIRATION RATE: 12 BRPM | DIASTOLIC BLOOD PRESSURE: 78 MMHG | WEIGHT: 158.8 LBS | HEIGHT: 67 IN | HEART RATE: 66 BPM | SYSTOLIC BLOOD PRESSURE: 118 MMHG

## 2022-02-07 DIAGNOSIS — G47.00 INSOMNIA, UNSPECIFIED TYPE: ICD-10-CM

## 2022-02-07 DIAGNOSIS — G47.33 OSA ON CPAP: Primary | ICD-10-CM

## 2022-02-07 DIAGNOSIS — Z99.89 OSA ON CPAP: Primary | ICD-10-CM

## 2022-02-07 PROCEDURE — 99213 OFFICE O/P EST LOW 20 MIN: CPT | Performed by: NURSE PRACTITIONER

## 2022-02-07 ASSESSMENT — SLEEP AND FATIGUE QUESTIONNAIRES
ESS TOTAL SCORE: 9
HOW LIKELY ARE YOU TO NOD OFF OR FALL ASLEEP WHILE SITTING AND READING: 2
HOW LIKELY ARE YOU TO NOD OFF OR FALL ASLEEP IN A CAR, WHILE STOPPED FOR A FEW MINUTES IN TRAFFIC: 0
HOW LIKELY ARE YOU TO NOD OFF OR FALL ASLEEP WHILE SITTING QUIETLY AFTER LUNCH WITHOUT ALCOHOL: 1
HOW LIKELY ARE YOU TO NOD OFF OR FALL ASLEEP WHEN YOU ARE A PASSENGER IN A CAR FOR AN HOUR WITHOUT A BREAK: 1
HOW LIKELY ARE YOU TO NOD OFF OR FALL ASLEEP WHILE LYING DOWN TO REST IN THE AFTERNOON WHEN CIRCUMSTANCES PERMIT: 3
HOW LIKELY ARE YOU TO NOD OFF OR FALL ASLEEP WHILE WATCHING TV: 2
HOW LIKELY ARE YOU TO NOD OFF OR FALL ASLEEP WHILE SITTING AND TALKING TO SOMEONE: 0
HOW LIKELY ARE YOU TO NOD OFF OR FALL ASLEEP WHILE SITTING INACTIVE IN A PUBLIC PLACE: 0

## 2022-02-07 NOTE — LETTER
[address] [place], [place] [address] E Shreya Caldera  Phone: [phone]  Fax: [address], APRN - CNP    February 10, 2022     Eve Jenkins MD  [address] [place] [address], [facility] [address]    Patient: Wu Bernard   MR Number: [id number]   YOB: 1965   Date of Visit: 2/7/2022       Dear Eve Jenkins:    Thank you for referring Osiel Lorenzo to me for evaluation/treatment. Below are the relevant portions of my assessment and plan of care. If you have questions, please do not hesitate to call me. I look forward to following [facility] along with you.     Sincerely,      CHARITO Andrea CNP

## 2022-06-06 ENCOUNTER — TELEMEDICINE (OUTPATIENT)
Dept: SLEEP MEDICINE | Age: 57
End: 2022-06-06

## 2022-06-06 DIAGNOSIS — G47.33 OBSTRUCTIVE SLEEP APNEA: Primary | ICD-10-CM

## 2022-06-06 DIAGNOSIS — F51.04 PSYCHOPHYSIOLOGICAL INSOMNIA: ICD-10-CM

## 2022-06-06 PROCEDURE — 99213 OFFICE O/P EST LOW 20 MIN: CPT | Performed by: NURSE PRACTITIONER

## 2022-06-06 NOTE — PROGRESS NOTES
Marilee Cobb is a 59 y.o. female being evaluated by a Virtual Visit (video visit) encounter to address concerns as mentioned below. A caregiver was present when appropriate. Due to this being a TeleHealth encounter (During QVHXW-79 public health emergency), evaluation of the following organ systems was limited: Vitals/Constitutional/EENT/Resp/CV/GI//MS/Neuro/Skin/Heme-Lymph-Imm. Pursuant to the emergency declaration under the [facility], 1135 waiver authority and the [facility] [facility] 3253-03J, this Virtual Visit was conducted with patient's (and/or legal guardian's) consent, to reduce the patient's risk of exposure to COVID-19 and provide necessary medical care. The patient (and/or legal guardian) has also been advised to contact this office for worsening conditions or problems, and seek emergency medical treatment and/or call 911 if deemed necessary. The patient (and/or legal guardian if applicable) is aware that this is a billable service, which includes applicable co-pays. This virtual visit was conducted with patient's (and/or legal guardian's) consent. Services were provided through a video synchronous discussion virtually to substitute for in-person clinic visit. Patient and provider were both  located remotely. Patient identification was confirmed by 2 forms of personal forms of identification (Birthday and Patient's address). The patient was located in a state where the provider was licensed to provide care. YOB: 1965  Address: [address] [place] [address] [phone] were provided through a video synchronous discussion virtually to substitute for in-person clinic visit. An electronic signature was used to authenticate this note.   Start time: 8:48 am  End time: 9:04 am       [facility]    Patient Name: Haider Larson  Age: 59 y.o.   : 1965    Date of Visit: 22        Review of Last Visit Summary:    The patient was last seen on 2022 for Obstructive Sleep Apnea and Insomnia. Interim History:     Haider Larson is a 59 y.o. female that  has a past medical history of Headache, Hyperlipidemia, Refusal of blood product, and Screening. She presents in follow up to Sleep Clinic to review CPAP adherence and efficacy. Interval Events:    -Patient continues to note improvement with CPAP usage, and better quality sleep. There are nights where she will unknowingly take off mask without knowing.   -She falls asleep with CPAP fine and wakes up around 12:30-1 am, takes a \"break\" and sometimes forgets to put it back on and will fall asleep.  -Nasal mask with benefit, feels pressures are adequate- noted improvement in pressure comfort since we adjusted pressures last visit. -Benefit such as decreased nocturnal awakenings, improved insomnia, and improved dry mouth with CPAP. DME: [address]    Sleep Study History: 21- [facility]-   [place], wt 152 lbs , sleep efficiency 53%, REM 13.5%,  AHI 12, RDI 20.7 , REM RDI 50, supine RDI 49, SPO2 chantel 78%, T<88% <1% of TST     Sleep History:    Patient presents with ongoing sleep difficulties that have been present for many years, but have recently worsened. Her main difficulty is remaining asleep, often times she will wake up around 1:30 am and have trouble falling back to sleep. Lately, within the past few weeks, patient has also had difficulty initiating sleep, taking several hours to sleep within these last few weeks. Prior to the last few weeks, it would take her 15 minutes to fall asleep. She cannot attribute any cause or trigger for this change.  No new medications, or illnesses have been established.  During the night she will occasionally go to the bathroom or usually just stay in bed.  She denies anxious thoughts running through her head but does feel frustrated that she cannot return to sleep. She is typically a side sleeper, but spends some time on her back.     Patient reports that she has cycles where insomnia will be improved for several weeks, and then back to insomnia symptoms.  Although she does have energy throughout the day, she can easily doze off.  She will nap several times a week briefly, naps only lasting about 10 minutes.  Patient will typically attempt to go to bed around 9 to 9:30 PM and wake up around 5 AM. She believes she receives around 4-5 hours of sleep at night. She does take Benadryl about once a week prior to bed, when she knows she will need to travel to see her mother the next day. She takes this in attempts to get a good night sleep, but does admit to brain fog the next day.  She also has tried taking 5 mg of melatonin in the past, which really have helped her symptoms but have given her chronic diarrhea so she stopped. [place] driving is a concern and a difficulty at times with patient. [place] are times she has dozed off, but denies getting into any accidents.  If she becomes drowsy while driving, she will put the air conditioning on, and talk to someone on the phone. 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     Her sleeping environment is dark and cool.  She has attempted to turn a TV on to fall asleep as well as sleeping and complete silence.  If she turns the TV on it is on a very neutral, and not stimulating a channel.  She states she does not sleep well when the environment is too hot. She typically only consumes one cup of coffee in the am as far as caffeine.     Patient has not had past sleep study.  She admits to snoring, daytimes sleepiness, difficulty with memory (brain fog), occasional AM headaches, dry mouth, and frequent awakenings through the night.     Weight remains steady for the most part, only gaining about 5 pounds this year.       Past Medical History:  Past Medical History:   Diagnosis Date    Headache     migraines    Hyperlipidemia     controls with diet and exercise    Refusal of blood product     Jehoval Witness    Screening     for colonoscopy       Past Surgical History:        Procedure Laterality Date    COLONOSCOPY  4/15/2016    ENDOMETRIAL ABLATION  2011    OVARY REMOVAL Right 2012    TUBAL LIGATION         Allergies:  is allergic to asa [aspirin]. Social History:    Social History     Tobacco Use    Smoking status: Never Smoker    Smokeless tobacco: Never Used   Substance Use Topics    Alcohol use: Yes     Alcohol/week: 0.0 standard drinks     Comment: occ glass wine    Drug use: No        Family History:       Problem Relation Age of Onset    Depression Mother        Current Medications:    Current Outpatient Medications:     Cholecalciferol (VITAMIN D3) 2000 units CAPS, Take 1 capsule by mouth daily , Disp: , Rfl:     Sleep Medicine 2/7/2022 9/15/2021   Sitting and reading 2 3   Watching TV 2 3   Sitting, inactive in a public place (e.g. a theatre or a meeting) 0 0   As a passenger in a car for an hour without a break 1 3   Lying down to rest in the afternoon when circumstances permit 3 3   Sitting and talking to someone 0 0   Sitting quietly after a lunch without alcohol 1 3   In a car, while stopped for a few minutes in traffic 0 3   Total score 9 18   Neck circumference (Inches) - 15       Review of Systems:    Constitutional: no chills, no fever , + sore throat  Eyes: no blurred vision  Cardiovascular: no chest pain,   Respiratory: no cough, no shortness of breath   Gastrointestinal:  no nausea,  no vomiting, no diarrhea. Neurological:  no dizziness,  no headache, no memory changes. Endocrine: No chills    Objective:   PHYSICAL EXAM:    There were no vitals taken for this visit. Physical exam:  Gen: No acute distress. BMI of There is no height or weight on file to calculate BMI. Neck: Trachea midline. No obvious mass.  Neck circumference mindset about her sleep. If she has a 'bad' night, she does not feel as much anxiety as she once did.   -Continue healthy sleep hygiene methods such as getting out of bed, doing a relaxing activity (no electronics or TV) for 10 minutes and get back into bed if she cannot sleep.  -Offered referral to new virtual sleep CBT-I program, but patient declined at this time. She will reconsider if symptoms worsen.  -Encouraged continued CPAP use. Follow up: Return in about 6 months (around 12/6/2022).     Inez Zhong, APRN-CNP  [facility]  P -[phone] option 2  f- [phone]

## 2022-06-07 ENCOUNTER — TELEPHONE (OUTPATIENT)
Dept: SLEEP CENTER | Age: 57
End: 2022-06-07

## 2022-08-11 ENCOUNTER — OFFICE VISIT (OUTPATIENT)
Dept: FAMILY MEDICINE CLINIC | Age: 57
End: 2022-08-11
Payer: COMMERCIAL

## 2022-08-11 VITALS
HEART RATE: 71 BPM | BODY MASS INDEX: 24.18 KG/M2 | WEIGHT: 154.4 LBS | TEMPERATURE: 97.8 F | DIASTOLIC BLOOD PRESSURE: 76 MMHG | RESPIRATION RATE: 16 BRPM | OXYGEN SATURATION: 98 % | SYSTOLIC BLOOD PRESSURE: 118 MMHG

## 2022-08-11 DIAGNOSIS — Z00.00 WELLNESS EXAMINATION: Primary | ICD-10-CM

## 2022-08-11 DIAGNOSIS — E55.9 VITAMIN D DEFICIENCY: ICD-10-CM

## 2022-08-11 DIAGNOSIS — K40.20 NON-RECURRENT BILATERAL INGUINAL HERNIA WITHOUT OBSTRUCTION OR GANGRENE: ICD-10-CM

## 2022-08-11 PROCEDURE — 99396 PREV VISIT EST AGE 40-64: CPT | Performed by: PHYSICIAN ASSISTANT

## 2022-08-11 SDOH — ECONOMIC STABILITY: FOOD INSECURITY: WITHIN THE PAST 12 MONTHS, YOU WORRIED THAT YOUR FOOD WOULD RUN OUT BEFORE YOU GOT MONEY TO BUY MORE.: NEVER TRUE

## 2022-08-11 SDOH — ECONOMIC STABILITY: FOOD INSECURITY: WITHIN THE PAST 12 MONTHS, THE FOOD YOU BOUGHT JUST DIDN'T LAST AND YOU DIDN'T HAVE MONEY TO GET MORE.: NEVER TRUE

## 2022-08-11 ASSESSMENT — PATIENT HEALTH QUESTIONNAIRE - PHQ9
2. FEELING DOWN, DEPRESSED OR HOPELESS: 0
SUM OF ALL RESPONSES TO PHQ QUESTIONS 1-9: 0
SUM OF ALL RESPONSES TO PHQ9 QUESTIONS 1 & 2: 0
SUM OF ALL RESPONSES TO PHQ QUESTIONS 1-9: 0
1. LITTLE INTEREST OR PLEASURE IN DOING THINGS: 0

## 2022-08-11 ASSESSMENT — LIFESTYLE VARIABLES
HOW MANY STANDARD DRINKS CONTAINING ALCOHOL DO YOU HAVE ON A TYPICAL DAY: 1 OR 2
HOW OFTEN DO YOU HAVE A DRINK CONTAINING ALCOHOL: MONTHLY OR LESS

## 2022-08-11 ASSESSMENT — SOCIAL DETERMINANTS OF HEALTH (SDOH): HOW HARD IS IT FOR YOU TO PAY FOR THE VERY BASICS LIKE FOOD, HOUSING, MEDICAL CARE, AND HEATING?: NOT VERY HARD

## 2022-08-11 NOTE — PROGRESS NOTES
Chief Complaint:  Annual Exam    History of Present Illness:  Source of history provided by:  patient. Presents for yearly exam  No acute concerns  Follows with sleep medicine for insomnia and [facility]  Started on CPAP and this has resolved her insomnia  Did see Dr. Bi Amanda for inguinal hernias  She did have these repaired  Sees dermatology regularly  Due for labs     Review of Systems: Unless otherwise stated in this report or unable to obtain because of the patient's clinical or mental status as evidenced by the medical record, this patients's positive and negative responses for Review of Systems, constitutional, psych, eyes, ENT, cardiovascular, respiratory, gastrointestinal, neurological, genitourinary, musculoskeletal, integument systems and systems related to the presenting problem are either stated in the preceding or were not pertinent or were negative for the symptoms and/or complaints related to the medical problem. Past Medical History:  has a past medical history of Headache, Hyperlipidemia, Refusal of blood product, Screening, and Sleep apnea. Past Surgical History:  has a past surgical history that includes Tubal ligation; Endometrial ablation (01/01/2011); Ovary removal (Right, 01/01/2012); Colonoscopy (04/15/2016); and hernia repair (Bilateral, 09/01/2021). Social History:  reports that she has never smoked. She has never used smokeless tobacco. She reports current alcohol use. She reports that she does not use drugs. Family History: family history includes Depression in her mother. Allergies: Asa [aspirin]    Physical Exam:  (Vital signs reviewed) /76   Pulse 71   Temp 97.8 °F (36.6 °C)   Resp 16   Wt 154 lb 6.4 oz (70 kg)   SpO2 98%   BMI 24.18 kg/m²   Oxygen Saturation Interpretation: Normal.   Constitutional:  Alert, development consistent with age. Eyes:  PERRL, EOMI, no discharge or conjunctival injection. Ears:  External ears without lesions.   TM's clear without erythema or

## 2022-08-15 LAB — MAMMOGRAPHY, EXTERNAL: NORMAL

## 2023-09-22 ENCOUNTER — OFFICE VISIT (OUTPATIENT)
Dept: FAMILY MEDICINE CLINIC | Age: 58
End: 2023-09-22
Payer: COMMERCIAL

## 2023-09-22 VITALS
WEIGHT: 151.8 LBS | SYSTOLIC BLOOD PRESSURE: 100 MMHG | RESPIRATION RATE: 16 BRPM | HEIGHT: 66 IN | OXYGEN SATURATION: 97 % | BODY MASS INDEX: 24.4 KG/M2 | TEMPERATURE: 96.9 F | DIASTOLIC BLOOD PRESSURE: 64 MMHG | HEART RATE: 84 BPM

## 2023-09-22 DIAGNOSIS — Z12.31 OTHER SCREENING MAMMOGRAM: ICD-10-CM

## 2023-09-22 DIAGNOSIS — Z23 NEED FOR INFLUENZA VACCINATION: Primary | ICD-10-CM

## 2023-09-22 DIAGNOSIS — Z00.00 WELLNESS EXAMINATION: ICD-10-CM

## 2023-09-22 DIAGNOSIS — Z00.00 ENCOUNTER FOR WELL ADULT EXAM WITHOUT ABNORMAL FINDINGS: ICD-10-CM

## 2023-09-22 PROCEDURE — 90674 CCIIV4 VAC NO PRSV 0.5 ML IM: CPT | Performed by: FAMILY MEDICINE

## 2023-09-22 PROCEDURE — 99396 PREV VISIT EST AGE 40-64: CPT | Performed by: FAMILY MEDICINE

## 2023-09-22 PROCEDURE — 90471 IMMUNIZATION ADMIN: CPT | Performed by: FAMILY MEDICINE

## 2023-09-22 SDOH — ECONOMIC STABILITY: FOOD INSECURITY: WITHIN THE PAST 12 MONTHS, YOU WORRIED THAT YOUR FOOD WOULD RUN OUT BEFORE YOU GOT MONEY TO BUY MORE.: NEVER TRUE

## 2023-09-22 SDOH — ECONOMIC STABILITY: FOOD INSECURITY: WITHIN THE PAST 12 MONTHS, THE FOOD YOU BOUGHT JUST DIDN'T LAST AND YOU DIDN'T HAVE MONEY TO GET MORE.: NEVER TRUE

## 2023-09-22 SDOH — ECONOMIC STABILITY: HOUSING INSECURITY
IN THE LAST 12 MONTHS, WAS THERE A TIME WHEN YOU DID NOT HAVE A STEADY PLACE TO SLEEP OR SLEPT IN A SHELTER (INCLUDING NOW)?: NO

## 2023-09-22 SDOH — ECONOMIC STABILITY: INCOME INSECURITY: HOW HARD IS IT FOR YOU TO PAY FOR THE VERY BASICS LIKE FOOD, HOUSING, MEDICAL CARE, AND HEATING?: NOT VERY HARD

## 2023-09-22 ASSESSMENT — PATIENT HEALTH QUESTIONNAIRE - PHQ9
1. LITTLE INTEREST OR PLEASURE IN DOING THINGS: 0
SUM OF ALL RESPONSES TO PHQ QUESTIONS 1-9: 0
SUM OF ALL RESPONSES TO PHQ QUESTIONS 1-9: 0
2. FEELING DOWN, DEPRESSED OR HOPELESS: 0
SUM OF ALL RESPONSES TO PHQ QUESTIONS 1-9: 0
SUM OF ALL RESPONSES TO PHQ9 QUESTIONS 1 & 2: 0
SUM OF ALL RESPONSES TO PHQ QUESTIONS 1-9: 0

## 2023-09-26 ASSESSMENT — ENCOUNTER SYMPTOMS
COUGH: 0
SHORTNESS OF BREATH: 0
EYES NEGATIVE: 1
WHEEZING: 0
GASTROINTESTINAL NEGATIVE: 1
CHEST TIGHTNESS: 0
ALLERGIC/IMMUNOLOGIC NEGATIVE: 1

## 2023-11-13 LAB
ALBUMIN SERPL-MCNC: NORMAL G/DL
ALP BLD-CCNC: NORMAL U/L
ALT SERPL-CCNC: NORMAL U/L
ANION GAP SERPL CALCULATED.3IONS-SCNC: NORMAL MMOL/L
AST SERPL-CCNC: NORMAL U/L
BASOPHILS ABSOLUTE: NORMAL
BASOPHILS RELATIVE PERCENT: NORMAL
BILIRUB SERPL-MCNC: NORMAL MG/DL
BUN BLDV-MCNC: NORMAL MG/DL
CALCIUM SERPL-MCNC: NORMAL MG/DL
CHLORIDE BLD-SCNC: NORMAL MMOL/L
CHOLESTEROL, TOTAL: 251 MG/DL
CHOLESTEROL/HDL RATIO: 3.9
CO2: NORMAL
CREAT SERPL-MCNC: NORMAL MG/DL
EGFR: NORMAL
EOSINOPHILS ABSOLUTE: NORMAL
EOSINOPHILS RELATIVE PERCENT: NORMAL
GLUCOSE BLD-MCNC: NORMAL MG/DL
HCT VFR BLD CALC: NORMAL %
HDLC SERPL-MCNC: 65 MG/DL (ref 35–70)
HEMOGLOBIN: NORMAL
LDL CHOLESTEROL CALCULATED: 161 MG/DL (ref 0–160)
LYMPHOCYTES ABSOLUTE: NORMAL
LYMPHOCYTES RELATIVE PERCENT: NORMAL
MCH RBC QN AUTO: NORMAL PG
MCHC RBC AUTO-ENTMCNC: NORMAL G/DL
MCV RBC AUTO: NORMAL FL
MONOCYTES ABSOLUTE: NORMAL
MONOCYTES RELATIVE PERCENT: NORMAL
NEUTROPHILS ABSOLUTE: NORMAL
NEUTROPHILS RELATIVE PERCENT: NORMAL
NONHDLC SERPL-MCNC: ABNORMAL MG/DL
PDW BLD-RTO: NORMAL %
PLATELET # BLD: NORMAL 10*3/UL
PMV BLD AUTO: NORMAL FL
POTASSIUM SERPL-SCNC: NORMAL MMOL/L
RBC # BLD: NORMAL 10*6/UL
SODIUM BLD-SCNC: NORMAL MMOL/L
TOTAL PROTEIN: NORMAL
TRIGL SERPL-MCNC: 127 MG/DL
VLDLC SERPL CALC-MCNC: ABNORMAL MG/DL
WBC # BLD: NORMAL 10*3/UL

## 2023-11-14 DIAGNOSIS — Z00.00 WELLNESS EXAMINATION: ICD-10-CM

## 2023-11-24 ENCOUNTER — HOSPITAL ENCOUNTER (OUTPATIENT)
Dept: GENERAL RADIOLOGY | Age: 58
Discharge: HOME | End: 2023-11-24
Payer: COMMERCIAL

## 2023-11-24 VITALS — WEIGHT: 155 LBS | BODY MASS INDEX: 24.33 KG/M2 | HEIGHT: 67 IN

## 2023-11-24 DIAGNOSIS — Z12.31 OTHER SCREENING MAMMOGRAM: ICD-10-CM

## 2023-11-24 PROCEDURE — 77067 SCR MAMMO BI INCL CAD: CPT

## 2023-11-24 PROCEDURE — 77063 BREAST TOMOSYNTHESIS BI: CPT

## 2023-12-26 ENCOUNTER — TELEPHONE (OUTPATIENT)
Dept: FAMILY MEDICINE CLINIC | Age: 58
End: 2023-12-26

## 2023-12-26 ENCOUNTER — OFFICE VISIT (OUTPATIENT)
Dept: FAMILY MEDICINE CLINIC | Age: 58
End: 2023-12-26
Payer: MEDICAID

## 2023-12-26 VITALS
BODY MASS INDEX: 24.28 KG/M2 | RESPIRATION RATE: 18 BRPM | TEMPERATURE: 97.2 F | WEIGHT: 155 LBS | OXYGEN SATURATION: 99 % | SYSTOLIC BLOOD PRESSURE: 128 MMHG | DIASTOLIC BLOOD PRESSURE: 62 MMHG | HEART RATE: 72 BPM

## 2023-12-26 DIAGNOSIS — H66.001 NON-RECURRENT ACUTE SUPPURATIVE OTITIS MEDIA OF RIGHT EAR WITHOUT SPONTANEOUS RUPTURE OF TYMPANIC MEMBRANE: Primary | ICD-10-CM

## 2023-12-26 PROCEDURE — 99213 OFFICE O/P EST LOW 20 MIN: CPT | Performed by: FAMILY MEDICINE

## 2023-12-26 RX ORDER — DOXYCYCLINE HYCLATE 100 MG
100 TABLET ORAL 2 TIMES DAILY
Qty: 20 TABLET | Refills: 0 | Status: SHIPPED | OUTPATIENT
Start: 2023-12-26 | End: 2024-01-05

## 2023-12-26 NOTE — TELEPHONE ENCOUNTER
Pt went for a mammo and they recommend her to get an ultrasound due to having dense breast. Please advise

## 2023-12-26 NOTE — PROGRESS NOTES
2023    Current Outpatient Medications   Medication Sig Dispense Refill    doxycycline hyclate (VIBRA-TABS) 100 MG tablet Take 1 tablet by mouth 2 times daily for 10 days 20 tablet 0    Cholecalciferol (VITAMIN D3) 2000 units CAPS Take 1 capsule by mouth daily       No current facility-administered medications for this visit. Judit Jurado (: 1965 IS A 59 y.o. female ,Established patient, here for eval of Congestion and Otalgia (Covid - at home this am )    Started Friday pm  ;ole a cpi;d amd bad spre thrpat  Ear pain full and poppinhg on t\he right  Thraot improving intermittently  No one sick at home   not sick    Jose  Checked sat am and this am covid negative        ASSESSMENT / PLAN      1. Non-recurrent acute suppurative otitis media of right ear without spontaneous rupture of tympanic membrane    - doxycycline hyclate (VIBRA-TABS) 100 MG tablet; Take 1 tablet by mouth 2 times daily for 10 days  Dispense: 20 tablet; Refill: 0              SUBJECTIVE    Review of Systems   Constitutional:  Negative for activity change, appetite change, fatigue and unexpected weight change. HENT:  Positive for ear pain, postnasal drip, rhinorrhea and sinus pain. Negative for congestion. Eyes: Negative. Negative for visual disturbance. Respiratory:  Negative for cough, chest tightness, shortness of breath and wheezing. Cardiovascular:  Negative for chest pain and palpitations. Gastrointestinal: Negative. Endocrine: Negative. Genitourinary: Negative. Musculoskeletal: Negative. Skin:  Negative for pallor, rash and wound. Allergic/Immunologic: Negative. Neurological: Negative. Negative for syncope. Hematological: Negative. Psychiatric/Behavioral: Negative.            OBJECTIVE    Wt Readings from Last 3 Encounters:   23 70.3 kg (155 lb)   23 70.3 kg (155 lb)   23 68.9 kg (151 lb 12.8 oz)       Vitals:    23 1353   BP: 128/62   Pulse:

## 2024-01-11 ENCOUNTER — OFFICE VISIT (OUTPATIENT)
Dept: FAMILY MEDICINE CLINIC | Age: 59
End: 2024-01-11
Payer: MEDICAID

## 2024-01-11 VITALS
TEMPERATURE: 97.3 F | BODY MASS INDEX: 24.81 KG/M2 | HEART RATE: 66 BPM | DIASTOLIC BLOOD PRESSURE: 70 MMHG | OXYGEN SATURATION: 100 % | WEIGHT: 158.4 LBS | SYSTOLIC BLOOD PRESSURE: 122 MMHG

## 2024-01-11 DIAGNOSIS — R92.30 DENSE BREAST TISSUE ON MAMMOGRAM, UNSPECIFIED TYPE: ICD-10-CM

## 2024-01-11 DIAGNOSIS — H69.90 DYSFUNCTION OF EUSTACHIAN TUBE, UNSPECIFIED LATERALITY: Primary | ICD-10-CM

## 2024-01-11 PROCEDURE — 99213 OFFICE O/P EST LOW 20 MIN: CPT | Performed by: PHYSICIAN ASSISTANT

## 2024-01-11 RX ORDER — UBIDECARENONE/VIT E/VIT E MIX 100-20-15
CAPSULE ORAL
COMMUNITY

## 2024-01-11 RX ORDER — FLUTICASONE PROPIONATE 50 MCG
1 SPRAY, SUSPENSION (ML) NASAL DAILY
Qty: 32 G | Refills: 0 | Status: SHIPPED | OUTPATIENT
Start: 2024-01-11

## 2024-01-11 ASSESSMENT — PATIENT HEALTH QUESTIONNAIRE - PHQ9
SUM OF ALL RESPONSES TO PHQ QUESTIONS 1-9: 0
SUM OF ALL RESPONSES TO PHQ QUESTIONS 1-9: 0
2. FEELING DOWN, DEPRESSED OR HOPELESS: 0
1. LITTLE INTEREST OR PLEASURE IN DOING THINGS: 0
SUM OF ALL RESPONSES TO PHQ QUESTIONS 1-9: 0
SUM OF ALL RESPONSES TO PHQ9 QUESTIONS 1 & 2: 0
SUM OF ALL RESPONSES TO PHQ QUESTIONS 1-9: 0

## 2024-01-11 NOTE — PROGRESS NOTES
Chief Complaint:   Ear Fullness (right)    History of Present Illness   Source of history provided by:  patient.      Kathryn Lombardi is a 58 y.o. old female presenting for evaluation of right ear fullness. Was seen for URI and ear infection a few weeks ago. Treated with doxycycline which provided relief of other symptoms. Ear still feels plugged. Denies any discharge from the ear canal. Denies any fever, chills, CP, SOB, abdominal pain, neck stiffness, rash, or lethargy.    Patient states that she had her mammogram; received a letter from [facility] that states she has dense breasts and would benefit from US. She would like to get this done.     ROS    Unless otherwise stated in this report or unable to obtain because of the patient's clinical or mental status as evidenced by the medical record, this patients's positive and negative responses for Review of Systems, constitutional, psych, eyes, ENT, cardiovascular, respiratory, gastrointestinal, neurological, genitourinary, musculoskeletal, integument systems and systems related to the presenting problem are either stated in the preceding or were not pertinent or were negative for the symptoms and/or complaints related to the medical problem.    Past Surgical History:  has a past surgical history that includes Tubal ligation; Endometrial ablation (01/01/2011); Ovary removal (Right, 01/01/2012); Colonoscopy (04/15/2016); hernia repair (Bilateral, 09/01/2021); and Breast biopsy.  Social History:  reports that she has never smoked. She has never used smokeless tobacco. She reports current alcohol use. She reports that she does not use drugs.  Family History: family history includes Breast Cancer in her maternal great grandmother; Depression in her mother.  Allergies: Asa [aspirin]    Physical Exam         VS:  /70   Pulse 66   Temp 97.3 °F (36.3 °C) (Temporal)   Wt 71.8 kg (158 lb 6.4 oz)   SpO2 100%   BMI 24.81 kg/m²    Oxygen Saturation

## 2024-02-21 ENCOUNTER — HOSPITAL ENCOUNTER (OUTPATIENT)
Dept: GENERAL RADIOLOGY | Age: 59
Discharge: HOME | End: 2024-02-23
Payer: MEDICAID

## 2024-02-21 DIAGNOSIS — R92.30 DENSE BREAST TISSUE ON MAMMOGRAM, UNSPECIFIED TYPE: ICD-10-CM

## 2024-02-21 PROCEDURE — 76641 ULTRASOUND BREAST COMPLETE: CPT

## 2024-09-27 NOTE — PROGRESS NOTES
[facility]    Patient Name: Bryan Brooks  Age: 60 y.o.   : 1965    Date of Visit: 2/3/22        Review of Last Visit Summary:    The patient was last seen on Visit date 9/15/2021 for Obstructive Sleep Apnea and Insomnia. Interval Events:     Bryan Brooks is a 60 y.o. female that  has a past medical history of Headache, Hyperlipidemia, Refusal of blood product, and Screening. She presents in follow up to Sleep Clinic to review CPAP adherence and efficacy.       -Patient received and is using her CPAP machine nightly. -She uses [address] as her DME and was given a \"Isatu\" CPAP. -Patient is currently using a full face mask, she needs to wake up \"once in a while\" due to a leak. -Pressures are comfortable, she usually sleeps on her side. There are times she puts her bed in anti snore position and receives the most restful sleep in this position, without CPAP- although she is compliant.  -There are nights that she will wake up and then fall back asleep without CPAP. -Patient notes she can easily fall asleep with device, but still is waking through the night (around 1 am) and feels \"wide awake\". She typically goes to bed around 9 pm.   -Overall, she feels less fatigue and more energy the next day when wearing CPAP. -She has purchased and read [place] Halsted to Insomnia\" and has been utilizing some CBT-I components, which is helping her feel less worried and frustrated about her sleep. DME:[facility]  Benefits: She feels less tired, more energy through the day. Less frustraing     Sleep History   Patient presents with ongoing sleep difficulties that have been present for many years, but have recently worsened. Her main difficulty is remaining asleep, often times she will wake up around 1:30 am and have trouble falling back to sleep. Lately, within the past few weeks, patient has also had difficulty initiating sleep, taking several hours to sleep within these last few weeks.  Prior to normal mood with appropriate affect the last few weeks, it would take her 15 minutes to fall asleep. She cannot attribute any cause or trigger for this change. No new medications, or illnesses have been established. During the night she will occasionally go to the bathroom or usually just stay in bed. She denies anxious thoughts running through her head but does feel frustrated that she cannot return to sleep. She is typically a side sleeper, but spends some time on her back.     Patient reports that she has cycles where insomnia will be improved for several weeks, and then back to insomnia symptoms. Although she does have energy throughout the day, she can easily doze off. She will nap several times a week briefly, naps only lasting about 10 minutes. Patient will typically attempt to go to bed around 9 to 9:30 PM and wake up around 5 AM. She believes she receives around 4-5 hours of sleep at night. She does take Benadryl about once a week prior to bed, when she knows she will need to travel to see her mother the next day. She takes this in attempts to get a good night sleep, but does admit to brain fog the next day. She also has tried taking 5 mg of melatonin in the past, which really have helped her symptoms but have given her chronic diarrhea so she stopped. Drowsy driving is a concern and a difficulty at times with patient. There are times she has dozed off, but denies getting into any accidents. If she becomes drowsy while driving, she will put the air conditioning on, and talk to someone on the phone. 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     Her sleeping environment is dark and cool. She has attempted to turn a TV on to fall asleep as well as sleeping and complete silence. If she turns the TV on it is on a very neutral, and not stimulating a channel. She states she does not sleep well when the environment is too hot.  She typically only consumes one cup of coffee in the am as far as caffeine.     Patient has not had past sleep study. She admits to snoring, daytimes sleepiness, difficulty with memory (brain fog), occasional AM headaches, dry mouth, and frequent awakenings through the night.     Weight remains steady for the most part, only gaining about 5 pounds this year.        Sleep Study History: No previous sleep study.     Bed time: 9 -9:30 pm    Wake time: 5 am    Sleep Latency (min): Prior to the last several weeks, 15 min and now lately it takes her hours to fall asleep.     Sleep Medications: When she has to drive, she will take 2 benadryl to fall asleep--- brain fog after taking this. Previously took melatonin. Awakenings:  Wakes up once, but cannot return to sleep  /bathroom or will stay in bed/ sometimes not able to return to sleep. Estimated Sleep time (hours):  4-5. Functions well on 6-7   Daytime Naps: Sometimes, lasting 10 min. Naps about 3 times a week. 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 Y,   Tonsillectomy? N  Have you lost or gained weight recently?  Gained about 5 pounds     PARASOMNIAS/ NARCOLEPSY:  Hypnogogic/Hynopompic Hallucinations: N   Sleep paralysis: N  Cataplexy: N   REM behavior symptoms: N  Nightmare: Rare  Sleep Walking: N  Sleep Talking: N  RLS Symptoms: N    Sleep Study History: 11/11/21- [facility]-   [place], wt 152 lbs , sleep efficiency 53%, REM 13.5%,  AHI 12, RDI 20.7 , REM RDI 50, supine RDI 49, SPO2 chantel 78%, T<88% <1% of TST    Past Medical History:  Past Medical History:   Diagnosis Date    Headache     migraines    Hyperlipidemia     controls with diet and exercise    Refusal of blood product     Jehoval Witness    Screening     for colonoscopy       Past Surgical History:        Procedure Laterality Date    COLONOSCOPY  4/15/2016    ENDOMETRIAL ABLATION  2011    OVARY REMOVAL Right 2012    TUBAL LIGATION         Allergies:  is allergic to asa [aspirin]. Social History:    Social History     Tobacco Use    Smoking status: Never Smoker    Smokeless tobacco: Never Used   Substance Use Topics    Alcohol use: Yes     Alcohol/week: 0.0 standard drinks     Comment: occ glass wine    Drug use: No        Family History:       Problem Relation Age of Onset    Depression Mother        Current Medications:    Current Outpatient Medications:     Cholecalciferol (VITAMIN D3) 2000 units CAPS, Take 1 capsule by mouth daily , Disp: , Rfl:     Sleep Medicine 2/7/2022 9/15/2021   Sitting and reading 2 3   Watching TV 2 3   Sitting, inactive in a public place (e.g. a theatre or a meeting) 0 0   As a passenger in a car for an hour without a break 1 3   Lying down to rest in the afternoon when circumstances permit 3 3   Sitting and talking to someone 0 0   Sitting quietly after a lunch without alcohol 1 3   In a car, while stopped for a few minutes in traffic 0 3   Total score 9 18   Neck circumference - 15       Review of Systems:    Constitutional: no chills, no fever   Eyes: no blurred vision  ENT: no hoarseness and no sore throat. Cardiovascular: no chest pain,   Respiratory: no cough, no shortness of breath   Gastrointestinal:  no nausea,  no vomiting, no diarrhea. Neurological:  no dizziness,  no headache, no memory changes.   Endocrine: No chills    Objective:   PHYSICAL EXAM:    /78 (Site: Right Upper Arm, Position: Sitting, Cuff Size: Medium Adult)   Pulse 66   Resp 12   Ht 5' 7\" (1.702 m)   Wt 158 lb 12.8 oz (72 kg)   SpO2 98%   BMI 24.87 kg/m²     Physical exam:  Gen: No acute distress. BMI of Body mass index is 24.87 kg/m². ENT: No nasal/oral discharge. Pharynx clear. Neck: Trachea midline. No obvious mass. Neck circumference     Resp: No accessory muscle use. No crackles. No wheezes. No rhonchi. CV: Regular rate. Regular rhythm. No murmur or rub. Skin: Warm and dry. No bleeding observed   M/S: No cyanosis. No obvious joint deformity. Neuro: Awake. Alert. Moves all four extremities. Psych: Alert and oriented. No anxiety. CPAP Compliance Download -- accessed via [employer] representative on 2/9/2022              Assessment:      Garth Enamorado was seen today for sleep apnea. Diagnoses and all orders for this visit:    MASON on CPAP  -     DME Order for CPAP as OP  -     DME Order for CPAP as OP    Insomnia, unspecified type    ·    Plan:     Mary Fountain is a 60 y.o. female that  has a past medical history of Headache, Hyperlipidemia, Refusal of blood product, and Screening. She presents in follow up to Sleep Clinic to review CPAP adherence and efficacy. She demonstrates excellent adherence with resolution of respiratory events (residual AHI 0.5). She notes benefit with CPAP therapy (namely, more energy during the day, less fatigue) and is motivated to continue use. She is also utilizes components of CBT-I for her maintenance insomnia. 1. Obstructive Sleep Apnea    -Based on sleep study results, review of device remote download, and discussion with patient, will continue auto-CPAP therapy at adjusted settings of 5-10 cm of water. - Unable to adjust pressures through website, since it is a Isatu device. Prescription will be sent to DME  - DME is [facility].  - Patient is using a full face mask. Mild leak noted at times, but rare. -Previously discussed pathophysiology of MASON and its impact on daily well-being, as well as cardiometabolic and neurocognitive health (particularly in moderate-severe cases).   -Patient understands that CPAP should be worn every night for the duration of the night  for maximum benefit. - Will try to encourage slightly longer usage.  -Try to remember to put device on when trying to go back to sleep.  -Order supply placed. 2. Chronic Sleep Maintenance Insomnia     -Patient reports that she purchased and read 'Say Phil to Insomnia' and found several useful points from book. Although she is still waking up through the night, she feels less frustrated and has more positive thoughts about her sleep. She has tried to improve her sleep hygiene as well. -Although she continues to wake through the night, she finds that with CPAP use, she at least has more energy the following day. Continue to treat sleep apnea. -Advised if she is laying in bed and begins to feel frustrated, get out of bed, do a relaxing activity (no electronics or TV) for 10 minutes and get back into bed. Follow up: Return in about 3 months (around 5/7/2022) for Follow up for sleep apnea, Follow up for insomnia.     CHARITO Morelos-CNP  [address] [facility]  p -[phone] option 2  f- [phone]

## 2024-11-13 ENCOUNTER — OFFICE VISIT (OUTPATIENT)
Dept: FAMILY MEDICINE CLINIC | Age: 59
End: 2024-11-13

## 2024-11-13 VITALS
HEART RATE: 72 BPM | SYSTOLIC BLOOD PRESSURE: 130 MMHG | WEIGHT: 157.8 LBS | BODY MASS INDEX: 24.71 KG/M2 | RESPIRATION RATE: 18 BRPM | DIASTOLIC BLOOD PRESSURE: 60 MMHG | OXYGEN SATURATION: 98 % | TEMPERATURE: 98.3 F

## 2024-11-13 DIAGNOSIS — R00.2 PALPITATIONS: ICD-10-CM

## 2024-11-13 DIAGNOSIS — Z00.00 ANNUAL PHYSICAL EXAM: Primary | ICD-10-CM

## 2024-11-13 DIAGNOSIS — Z00.00 ENCOUNTER FOR WELL ADULT EXAM WITHOUT ABNORMAL FINDINGS: ICD-10-CM

## 2024-11-13 DIAGNOSIS — E55.9 VITAMIN D DEFICIENCY: ICD-10-CM

## 2024-11-13 RX ORDER — KETOCONAZOLE 20 MG/G
CREAM TOPICAL AS NEEDED
COMMUNITY
Start: 2024-10-23 | End: 2024-11-13

## 2024-11-13 RX ORDER — KETOCONAZOLE 20 MG/ML
SHAMPOO TOPICAL AS NEEDED
COMMUNITY
Start: 2024-10-23 | End: 2024-11-13

## 2024-11-13 SDOH — ECONOMIC STABILITY: FOOD INSECURITY: WITHIN THE PAST 12 MONTHS, THE FOOD YOU BOUGHT JUST DIDN'T LAST AND YOU DIDN'T HAVE MONEY TO GET MORE.: NEVER TRUE

## 2024-11-13 SDOH — ECONOMIC STABILITY: FOOD INSECURITY: WITHIN THE PAST 12 MONTHS, YOU WORRIED THAT YOUR FOOD WOULD RUN OUT BEFORE YOU GOT MONEY TO BUY MORE.: NEVER TRUE

## 2024-11-13 SDOH — ECONOMIC STABILITY: INCOME INSECURITY: HOW HARD IS IT FOR YOU TO PAY FOR THE VERY BASICS LIKE FOOD, HOUSING, MEDICAL CARE, AND HEATING?: NOT HARD AT ALL

## 2024-11-13 NOTE — PROGRESS NOTES
Well Adult Note  Name: Kathryn Lombardi Today’s Date: 2024   MRN: [id number] Sex: Female   Age: 59 y.o. Ethnicity: Non- / Non    : 1965 Race: White (non-)      Kathryn Lombardi is here for a well adult exam.          Assessment & Plan  1. Preventative visit.  She is here for her annual wellness visit. Given her low risk for hepatitis C and HIV, these tests will not be conducted. Her total cholesterol was slightly elevated at 161 last year, with an increase in LDL. Despite this, her 10-year risk for heart disease and stroke remains low at 4 percent. A balanced diet is suggested to manage her cholesterol levels, including reducing intake of fast food, low-fat dairy, and limiting saturated fat to 20 g or less daily. Her vitamin D level was satisfactory in  at 38.8, but it will be rechecked today. An EKG will be performed today due to a recent episode of palpitations.    2. Health Maintenance.  A Pap smear is recommended as it has been more than five years since her last one. She is advised to receive the shingles vaccine, which is a two-part series. She will also have a mammogram soon.    Annual physical exam  -     CBC with Auto Differential; Future  -     Comprehensive Metabolic Panel; Future  -     Lipid Panel; Future  -     Vitamin D 25 Hydroxy; Future  Vitamin D deficiency  -     Vitamin D 25 Hydroxy; Future  Palpitations  -     EKG 12 Lead - Clinic Performed; Future  EKG nromal  Encounter for well adult exam without abnormal findings    Results  The 10-year ASCVD risk score (Peggy VILLASENOR, et al., 2019) is: 4%    Values used to calculate the score:      Age: 59 years      Sex: Female      Is Non- : No      Diabetic: No      Tobacco smoker: No      Systolic Blood Pressure: 130 mmHg      Is BP treated: No      HDL Cholesterol: 65 mg/dL      Total Cholesterol: 251 mg/dL        Laboratory Studies  Total cholesterol was 161. Vitamin D level was 38.8 in

## 2024-11-13 NOTE — PATIENT INSTRUCTIONS
CHOLESTEROL 400 MG DAIILY  SATURATED 20 GM DAILY       Well Visit, Ages 18 to 65: Care Instructions  Well visits can help you stay healthy. Your doctor has checked your overall health and may have suggested ways to take good care of yourself. Your doctor also may have recommended tests. You can help prevent illness with healthy eating, good sleep, vaccinations, regular exercise, and other steps.    Get the tests that you and your doctor decide on. Depending on your age and risks, examples might include screening for diabetes; hepatitis C; HIV; and cervical, breast, lung, and colon cancer. Screening helps find diseases before any symptoms appear.   Eat healthy foods. Choose fruits, vegetables, whole grains, lean protein, and low-fat dairy foods. Limit saturated fat and reduce salt.     Limit alcohol. Men should have no more than 2 drinks a day. Women should have no more than 1. For some people, no alcohol is the best choice.   Exercise. Get at least 30 minutes of exercise on most days of the week. Walking can be a good choice.     Reach and stay at your healthy weight. This will lower your risk for many health problems.   Take care of your mental health. Try to stay connected with friends, family, and community, and find ways to manage stress.     If you're feeling depressed or hopeless, talk to someone. A counselor can help. If you don't have a counselor, talk to your doctor.   Talk to your doctor if you think you may have a problem with alcohol or drug use. This includes prescription medicines, marijuana, and other drugs.     Avoid tobacco and nicotine: Don't smoke, vape, or chew. If you need help quitting, talk to your doctor.   Practice safer sex. Getting tested, using condoms or dental dams, and limiting sex partners can help prevent STIs.     Use birth control if it's important to you to prevent pregnancy. Talk with your doctor about your choices and what might be best for you.   Prevent problems where you can.

## 2024-11-16 ASSESSMENT — ENCOUNTER SYMPTOMS
WHEEZING: 0
EYES NEGATIVE: 1
SHORTNESS OF BREATH: 0
GASTROINTESTINAL NEGATIVE: 1
COUGH: 0
CHEST TIGHTNESS: 0
ALLERGIC/IMMUNOLOGIC NEGATIVE: 1

## 2024-12-05 ENCOUNTER — TELEPHONE (OUTPATIENT)
Dept: FAMILY MEDICINE CLINIC | Age: 59
End: 2024-12-05

## 2024-12-05 DIAGNOSIS — Z12.31 ENCOUNTER FOR SCREENING MAMMOGRAM FOR MALIGNANT NEOPLASM OF BREAST: Primary | ICD-10-CM

## 2024-12-05 NOTE — TELEPHONE ENCOUNTER
----- Message from Gisel FOURNIER sent at 12/5/2024 11:54 AM EST -----  Regarding: [facility] Referral Request  [facility] Referral Request    Reason for referral request: Lab/Test Order    Specialist/Lab/Test patient is requesting (if known): Mammogram     Additional Information: patient is requesting to have an order to schedule for a mammogram. The patient already has a scheduled appointment but they need an order from the pcp.   --------------------------------------------------------------------------------------------------------------------------    Relationship to Patient: Self     Call Back Information: OK to leave message on voicemail  Preferred Call Back Number: Phone [phone]

## 2024-12-20 DIAGNOSIS — E55.9 VITAMIN D DEFICIENCY: ICD-10-CM

## 2024-12-20 DIAGNOSIS — Z00.00 ANNUAL PHYSICAL EXAM: ICD-10-CM

## 2024-12-20 LAB
ALBUMIN: 4.3 G/DL (ref 3.5–5.2)
ALP BLD-CCNC: 66 U/L (ref 35–104)
ALT SERPL-CCNC: 13 U/L (ref 0–32)
ANION GAP SERPL CALCULATED.3IONS-SCNC: 8 MMOL/L (ref 7–16)
AST SERPL-CCNC: 17 U/L (ref 0–31)
BASOPHILS ABSOLUTE: 0.04 K/UL (ref 0–0.2)
BASOPHILS RELATIVE PERCENT: 1 % (ref 0–2)
BILIRUB SERPL-MCNC: 0.4 MG/DL (ref 0–1.2)
BUN BLDV-MCNC: 13 MG/DL (ref 6–23)
CALCIUM SERPL-MCNC: 9.4 MG/DL (ref 8.6–10.2)
CHLORIDE BLD-SCNC: 108 MMOL/L (ref 98–107)
CHOLESTEROL, TOTAL: 209 MG/DL
CO2: 27 MMOL/L (ref 22–29)
CREAT SERPL-MCNC: 0.7 MG/DL (ref 0.5–1)
EOSINOPHILS ABSOLUTE: 0.14 K/UL (ref 0.05–0.5)
EOSINOPHILS RELATIVE PERCENT: 3 % (ref 0–6)
GFR, ESTIMATED: >90 ML/MIN/1.73M2
GLUCOSE BLD-MCNC: 98 MG/DL (ref 74–99)
HCT VFR BLD CALC: 42.7 % (ref 34–48)
HDLC SERPL-MCNC: 58 MG/DL
HEMOGLOBIN: 13.5 G/DL (ref 11.5–15.5)
IMMATURE GRANULOCYTES %: 0 % (ref 0–5)
IMMATURE GRANULOCYTES ABSOLUTE: <0.03 K/UL (ref 0–0.58)
LDL CHOLESTEROL: 134 MG/DL
LYMPHOCYTES ABSOLUTE: 2.07 K/UL (ref 1.5–4)
LYMPHOCYTES RELATIVE PERCENT: 40 % (ref 20–42)
MCH RBC QN AUTO: 30.4 PG (ref 26–35)
MCHC RBC AUTO-ENTMCNC: 31.6 G/DL (ref 32–34.5)
MCV RBC AUTO: 96.2 FL (ref 80–99.9)
MONOCYTES ABSOLUTE: 0.33 K/UL (ref 0.1–0.95)
MONOCYTES RELATIVE PERCENT: 6 % (ref 2–12)
NEUTROPHILS ABSOLUTE: 2.65 K/UL (ref 1.8–7.3)
NEUTROPHILS RELATIVE PERCENT: 51 % (ref 43–80)
PDW BLD-RTO: 12.7 % (ref 11.5–15)
PLATELET # BLD: 233 K/UL (ref 130–450)
PMV BLD AUTO: 12.6 FL (ref 7–12)
POTASSIUM SERPL-SCNC: 4.5 MMOL/L (ref 3.5–5)
RBC # BLD: 4.44 M/UL (ref 3.5–5.5)
SODIUM BLD-SCNC: 143 MMOL/L (ref 132–146)
TOTAL PROTEIN: 6.8 G/DL (ref 6.4–8.3)
TRIGL SERPL-MCNC: 87 MG/DL
VITAMIN D 25-HYDROXY: 23.9 NG/ML (ref 30–100)
VLDLC SERPL CALC-MCNC: 17 MG/DL
WBC # BLD: 5.2 K/UL (ref 4.5–11.5)

## 2024-12-31 ENCOUNTER — HOSPITAL ENCOUNTER (OUTPATIENT)
Dept: GENERAL RADIOLOGY | Age: 59
Discharge: HOME | End: 2025-01-02
Payer: MEDICAID

## 2024-12-31 VITALS — BODY MASS INDEX: 24.33 KG/M2 | HEIGHT: 67 IN | WEIGHT: 155 LBS

## 2024-12-31 DIAGNOSIS — Z12.31 ENCOUNTER FOR SCREENING MAMMOGRAM FOR MALIGNANT NEOPLASM OF BREAST: ICD-10-CM

## 2024-12-31 PROCEDURE — 77063 BREAST TOMOSYNTHESIS BI: CPT

## 2025-06-05 ENCOUNTER — TELEPHONE (OUTPATIENT)
Dept: FAMILY MEDICINE CLINIC | Age: 60
End: 2025-06-05

## 2025-06-05 DIAGNOSIS — S00.86XA TICK BITE OF OTHER PART OF HEAD, INITIAL ENCOUNTER: Primary | ICD-10-CM
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
## 2025-06-05 RX ORDER — DOXYCYCLINE 100 MG/1
100 CAPSULE ORAL 2 TIMES DAILY
Qty: 1 CAPSULE | Refills: 0 | Status: SHIPPED | OUTPATIENT
Start: 2025-06-05 | End: 2025-06-06

## 2025-06-05 NOTE — TELEPHONE ENCOUNTER
Patient called, stated she removed a tick from herself this morning. It was in her scalp, she believes it was there for a couple days. She said she thinks she got the head out. Patient would like to know if she needs antibiotics. Patient uses [facility] in [place]. Patient states, no fever or any symptoms. She just found it when she was washing her hair this morning. She already has disposed of the Tick.